# Patient Record
Sex: MALE | Race: OTHER | NOT HISPANIC OR LATINO
[De-identification: names, ages, dates, MRNs, and addresses within clinical notes are randomized per-mention and may not be internally consistent; named-entity substitution may affect disease eponyms.]

---

## 2018-08-28 ENCOUNTER — APPOINTMENT (OUTPATIENT)
Dept: ORTHOPEDIC SURGERY | Facility: CLINIC | Age: 63
End: 2018-08-28
Payer: MEDICARE

## 2018-08-28 VITALS
WEIGHT: 160.2 LBS | HEART RATE: 80 BPM | HEIGHT: 67 IN | BODY MASS INDEX: 25.15 KG/M2 | SYSTOLIC BLOOD PRESSURE: 122 MMHG | DIASTOLIC BLOOD PRESSURE: 68 MMHG

## 2018-08-28 DIAGNOSIS — Z60.2 PROBLEMS RELATED TO LIVING ALONE: ICD-10-CM

## 2018-08-28 DIAGNOSIS — Z86.79 PERSONAL HISTORY OF OTHER DISEASES OF THE CIRCULATORY SYSTEM: ICD-10-CM

## 2018-08-28 DIAGNOSIS — Z82.61 FAMILY HISTORY OF ARTHRITIS: ICD-10-CM

## 2018-08-28 DIAGNOSIS — Z72.3 LACK OF PHYSICAL EXERCISE: ICD-10-CM

## 2018-08-28 DIAGNOSIS — Z86.39 PERSONAL HISTORY OF OTHER ENDOCRINE, NUTRITIONAL AND METABOLIC DISEASE: ICD-10-CM

## 2018-08-28 PROCEDURE — 99204 OFFICE O/P NEW MOD 45 MIN: CPT

## 2018-08-28 PROCEDURE — 73564 X-RAY EXAM KNEE 4 OR MORE: CPT | Mod: LT,RT

## 2018-08-28 RX ORDER — SIMVASTATIN 80 MG/1
80 TABLET, FILM COATED ORAL
Refills: 0 | Status: ACTIVE | COMMUNITY

## 2018-08-28 SDOH — SOCIAL STABILITY - SOCIAL INSECURITY: PROBLEMS RELATED TO LIVING ALONE: Z60.2

## 2018-09-05 ENCOUNTER — CHART COPY (OUTPATIENT)
Age: 63
End: 2018-09-05

## 2018-09-12 ENCOUNTER — APPOINTMENT (OUTPATIENT)
Dept: ORTHOPEDIC SURGERY | Facility: CLINIC | Age: 63
End: 2018-09-12

## 2018-09-12 ENCOUNTER — CHART COPY (OUTPATIENT)
Age: 63
End: 2018-09-12

## 2018-09-26 ENCOUNTER — APPOINTMENT (OUTPATIENT)
Dept: ORTHOPEDIC SURGERY | Facility: CLINIC | Age: 63
End: 2018-09-26
Payer: MEDICARE

## 2018-09-26 VITALS
WEIGHT: 160 LBS | DIASTOLIC BLOOD PRESSURE: 90 MMHG | SYSTOLIC BLOOD PRESSURE: 156 MMHG | BODY MASS INDEX: 25.11 KG/M2 | HEIGHT: 67 IN | HEART RATE: 79 BPM

## 2018-09-26 DIAGNOSIS — M17.11 UNILATERAL PRIMARY OSTEOARTHRITIS, RIGHT KNEE: ICD-10-CM

## 2018-09-26 DIAGNOSIS — M25.562 PAIN IN RIGHT KNEE: ICD-10-CM

## 2018-09-26 DIAGNOSIS — M25.561 PAIN IN RIGHT KNEE: ICD-10-CM

## 2018-09-26 DIAGNOSIS — M23.92 UNSPECIFIED INTERNAL DERANGEMENT OF LEFT KNEE: ICD-10-CM

## 2018-09-26 DIAGNOSIS — G89.29 PAIN IN RIGHT KNEE: ICD-10-CM

## 2018-09-26 DIAGNOSIS — M17.12 UNILATERAL PRIMARY OSTEOARTHRITIS, LEFT KNEE: ICD-10-CM

## 2018-09-26 PROCEDURE — 99215 OFFICE O/P EST HI 40 MIN: CPT

## 2018-09-26 PROCEDURE — 73564 X-RAY EXAM KNEE 4 OR MORE: CPT | Mod: LT,RT

## 2019-03-06 ENCOUNTER — APPOINTMENT (OUTPATIENT)
Dept: INTERNAL MEDICINE | Facility: CLINIC | Age: 64
End: 2019-03-06
Payer: MEDICARE

## 2019-03-06 VITALS
SYSTOLIC BLOOD PRESSURE: 128 MMHG | BODY MASS INDEX: 26.84 KG/M2 | HEART RATE: 83 BPM | OXYGEN SATURATION: 97 % | HEIGHT: 66.5 IN | WEIGHT: 169 LBS | DIASTOLIC BLOOD PRESSURE: 70 MMHG | TEMPERATURE: 97.6 F

## 2019-03-06 DIAGNOSIS — D64.9 ANEMIA, UNSPECIFIED: ICD-10-CM

## 2019-03-06 DIAGNOSIS — I25.2 OLD MYOCARDIAL INFARCTION: ICD-10-CM

## 2019-03-06 DIAGNOSIS — Z86.79 PERSONAL HISTORY OF OTHER DISEASES OF THE CIRCULATORY SYSTEM: ICD-10-CM

## 2019-03-06 DIAGNOSIS — Z63.5 DISRUPTION OF FAMILY BY SEPARATION AND DIVORCE: ICD-10-CM

## 2019-03-06 DIAGNOSIS — Z12.5 ENCOUNTER FOR SCREENING FOR MALIGNANT NEOPLASM OF PROSTATE: ICD-10-CM

## 2019-03-06 DIAGNOSIS — Z87.74 PERSONAL HISTORY OF (CORRECTED) CONGENITAL MALFORMATIONS OF HEART AND CIRCULATORY SYSTEM: ICD-10-CM

## 2019-03-06 DIAGNOSIS — Z72.0 TOBACCO USE: ICD-10-CM

## 2019-03-06 PROCEDURE — 36415 COLL VENOUS BLD VENIPUNCTURE: CPT

## 2019-03-06 PROCEDURE — G0439: CPT

## 2019-03-06 PROCEDURE — 99214 OFFICE O/P EST MOD 30 MIN: CPT | Mod: 25

## 2019-03-06 RX ORDER — TIMOLOL MALEATE 2.5 MG/ML
0.25 SOLUTION/ DROPS OPHTHALMIC
Refills: 0 | Status: ACTIVE | COMMUNITY

## 2019-03-06 SDOH — SOCIAL STABILITY - SOCIAL INSECURITY: DISRUPTION OF FAMILY BY SEPARATION AND DIVORCE: Z63.5

## 2019-03-14 ENCOUNTER — APPOINTMENT (OUTPATIENT)
Dept: SURGERY | Facility: CLINIC | Age: 64
End: 2019-03-14
Payer: MEDICARE

## 2019-03-14 VITALS
OXYGEN SATURATION: 98 % | TEMPERATURE: 97.8 F | SYSTOLIC BLOOD PRESSURE: 154 MMHG | RESPIRATION RATE: 16 BRPM | HEART RATE: 70 BPM | DIASTOLIC BLOOD PRESSURE: 87 MMHG

## 2019-03-14 DIAGNOSIS — Z82.49 FAMILY HISTORY OF ISCHEMIC HEART DISEASE AND OTHER DISEASES OF THE CIRCULATORY SYSTEM: ICD-10-CM

## 2019-03-14 DIAGNOSIS — R06.02 SHORTNESS OF BREATH: ICD-10-CM

## 2019-03-14 DIAGNOSIS — Z82.3 FAMILY HISTORY OF STROKE: ICD-10-CM

## 2019-03-14 PROCEDURE — 99205 OFFICE O/P NEW HI 60 MIN: CPT

## 2019-03-14 NOTE — REASON FOR VISIT
[Consultation] : a consultation visit [FreeTextEntry1] : right inguinal pain, eferred by Dr. Mckinley Bardales

## 2019-03-14 NOTE — ASSESSMENT
[FreeTextEntry1] : This 64-year-old patient is suffering from a right inguinal hernia. I have offered him repair of this hernia. He has multiple cardiac comorbidities.  Please advise me on the safety of discontinuing his Coumadin preoperatively. Will he need to be bridged to the operation with Lovenox or intravenous heparin?  Would you advise a spinal anesthetic instead of general anesthesia question?

## 2019-03-14 NOTE — CONSULT LETTER
[Dear  ___] : Dear  [unfilled], [Sincerely,] : Sincerely, [FreeTextEntry3] : Charles Leo M.D. [DrMoises  ___] : Dr. REHMAN

## 2019-03-14 NOTE — PHYSICAL EXAM
[Normal Breath Sounds] : Normal breath sounds [Normal Heart Sounds] : normal heart sounds [Murmur] : murmur was appreciated  [No Rash or Lesion] : No rash or lesion [Calm] : calm [de-identified] : normal [de-identified] : normal [de-identified] : nor [de-identified] : soft nontender nondistended. They are easily reducible nontender incisional hernias either side of the midline; there is an easily reducible right inguinal hernia [de-identified] : penis and testicles normal [de-identified] : normal [de-identified] : normal

## 2019-03-19 LAB
ALBUMIN SERPL ELPH-MCNC: 4.4 G/DL
ALP BLD-CCNC: 73 U/L
ALT SERPL-CCNC: 24 U/L
ANION GAP SERPL CALC-SCNC: 11 MMOL/L
APPEARANCE: CLEAR
AST SERPL-CCNC: 29 U/L
BASOPHILS # BLD AUTO: 0.09 K/UL
BASOPHILS NFR BLD AUTO: 1 %
BILIRUB SERPL-MCNC: 0.6 MG/DL
BILIRUBIN URINE: NEGATIVE
BLOOD URINE: NEGATIVE
BUN SERPL-MCNC: 14 MG/DL
CALCIUM SERPL-MCNC: 9.3 MG/DL
CHLORIDE SERPL-SCNC: 106 MMOL/L
CHOLEST SERPL-MCNC: 137 MG/DL
CHOLEST/HDLC SERPL: 2.5 RATIO
CO2 SERPL-SCNC: 22 MMOL/L
COLOR: YELLOW
CREAT SERPL-MCNC: 1.33 MG/DL
EOSINOPHIL # BLD AUTO: 0.87 K/UL
EOSINOPHIL NFR BLD AUTO: 9.9 %
GLUCOSE QUALITATIVE U: NEGATIVE
GLUCOSE SERPL-MCNC: 105 MG/DL
HBA1C MFR BLD HPLC: 5.7 %
HCT VFR BLD CALC: 44.6 %
HDLC SERPL-MCNC: 54 MG/DL
HGB BLD-MCNC: 14.1 G/DL
IMM GRANULOCYTES NFR BLD AUTO: 0.2 %
INR PPP: 3.58 RATIO
KETONES URINE: NEGATIVE
LDLC SERPL CALC-MCNC: 63 MG/DL
LEUKOCYTE ESTERASE URINE: NEGATIVE
LYMPHOCYTES # BLD AUTO: 1.38 K/UL
LYMPHOCYTES NFR BLD AUTO: 15.7 %
MAN DIFF?: NORMAL
MCHC RBC-ENTMCNC: 29.1 PG
MCHC RBC-ENTMCNC: 31.6 GM/DL
MCV RBC AUTO: 92 FL
MONOCYTES # BLD AUTO: 0.57 K/UL
MONOCYTES NFR BLD AUTO: 6.5 %
NEUTROPHILS # BLD AUTO: 5.85 K/UL
NEUTROPHILS NFR BLD AUTO: 66.7 %
NITRITE URINE: NEGATIVE
PH URINE: 6.5
PLATELET # BLD AUTO: 273 K/UL
POTASSIUM SERPL-SCNC: 4.7 MMOL/L
PROT SERPL-MCNC: 7 G/DL
PROTEIN URINE: NEGATIVE
PSA SERPL-MCNC: 2.23 NG/ML
PT BLD: 42.5 SEC
RBC # BLD: 4.85 M/UL
RBC # FLD: 14.6 %
SODIUM SERPL-SCNC: 138 MMOL/L
SPECIFIC GRAVITY URINE: 1.01
T4 FREE SERPL-MCNC: 1.2 NG/DL
TRIGL SERPL-MCNC: 99 MG/DL
TSH SERPL-ACNC: 3.85 UIU/ML
UROBILINOGEN URINE: NORMAL
WBC # FLD AUTO: 8.78 K/UL

## 2019-04-29 ENCOUNTER — APPOINTMENT (OUTPATIENT)
Dept: INTERNAL MEDICINE | Facility: CLINIC | Age: 64
End: 2019-04-29
Payer: MEDICARE

## 2019-04-29 VITALS
TEMPERATURE: 97.8 F | SYSTOLIC BLOOD PRESSURE: 140 MMHG | HEIGHT: 66.5 IN | HEART RATE: 78 BPM | WEIGHT: 160 LBS | DIASTOLIC BLOOD PRESSURE: 70 MMHG | OXYGEN SATURATION: 95 % | BODY MASS INDEX: 25.41 KG/M2

## 2019-04-29 DIAGNOSIS — Z87.74 PERSONAL HISTORY OF (CORRECTED) CONGENITAL MALFORMATIONS OF HEART AND CIRCULATORY SYSTEM: ICD-10-CM

## 2019-04-29 DIAGNOSIS — Z95.2 PRESENCE OF PROSTHETIC HEART VALVE: ICD-10-CM

## 2019-04-29 LAB
INR PPP: 3
PT BLD: 30.2

## 2019-04-29 PROCEDURE — 99215 OFFICE O/P EST HI 40 MIN: CPT | Mod: 25

## 2019-04-29 PROCEDURE — 36415 COLL VENOUS BLD VENIPUNCTURE: CPT

## 2019-04-29 RX ORDER — CLONAZEPAM 0.5 MG/1
0.5 TABLET ORAL
Qty: 30 | Refills: 0 | Status: ACTIVE | COMMUNITY

## 2019-05-03 LAB
25(OH)D3 SERPL-MCNC: 32.9 NG/ML
VIT B12 SERPL-MCNC: 435 PG/ML

## 2019-05-07 ENCOUNTER — OUTPATIENT (OUTPATIENT)
Dept: OUTPATIENT SERVICES | Facility: HOSPITAL | Age: 64
LOS: 1 days | End: 2019-05-07
Payer: MEDICARE

## 2019-05-07 VITALS
RESPIRATION RATE: 14 BRPM | HEART RATE: 70 BPM | HEIGHT: 67 IN | TEMPERATURE: 98 F | WEIGHT: 156.97 LBS | SYSTOLIC BLOOD PRESSURE: 134 MMHG | DIASTOLIC BLOOD PRESSURE: 79 MMHG | OXYGEN SATURATION: 96 %

## 2019-05-07 DIAGNOSIS — Z95.4 PRESENCE OF OTHER HEART-VALVE REPLACEMENT: Chronic | ICD-10-CM

## 2019-05-07 DIAGNOSIS — Z95.0 PRESENCE OF CARDIAC PACEMAKER: ICD-10-CM

## 2019-05-07 DIAGNOSIS — K40.90 UNILATERAL INGUINAL HERNIA, WITHOUT OBSTRUCTION OR GANGRENE, NOT SPECIFIED AS RECURRENT: ICD-10-CM

## 2019-05-07 DIAGNOSIS — Z98.890 OTHER SPECIFIED POSTPROCEDURAL STATES: Chronic | ICD-10-CM

## 2019-05-07 DIAGNOSIS — Z95.2 PRESENCE OF PROSTHETIC HEART VALVE: ICD-10-CM

## 2019-05-07 DIAGNOSIS — Z01.818 ENCOUNTER FOR OTHER PREPROCEDURAL EXAMINATION: ICD-10-CM

## 2019-05-07 PROCEDURE — G0463: CPT

## 2019-05-07 RX ORDER — CEFOTETAN DISODIUM 1 G
2 VIAL (EA) INJECTION ONCE
Refills: 0 | Status: DISCONTINUED | OUTPATIENT
Start: 2019-05-14 | End: 2019-05-16

## 2019-05-07 NOTE — H&P PST ADULT - NSANTHOSAYNRD_GEN_A_CORE
No. CAPO screening performed.  STOP BANG Legend: 0-2 = LOW Risk; 3-4 = INTERMEDIATE Risk; 5-8 = HIGH Risk

## 2019-05-07 NOTE — H&P PST ADULT - NSICDXPASTSURGICALHX_GEN_ALL_CORE_FT
PAST SURGICAL HISTORY:  S/P aortic valve replacement with metallic valve 1990 and 2003    S/P exploratory laparotomy after valve replacement 2003

## 2019-05-07 NOTE — H&P PST ADULT - NSICDXPASTMEDICALHX_GEN_ALL_CORE_FT
PAST MEDICAL HISTORY:  H/O mechanical aortic valve replacement     Right inguinal hernia PAST MEDICAL HISTORY:  Afib     Anxiety     Bicuspid aortic valve     Depression     Right inguinal hernia     Subdural hematoma remote history PAST MEDICAL HISTORY:  Afib     Anxiety     Bicuspid aortic valve     Chronic shortness of breath with exertion    Depression taking MAOI    Glaucoma     Hepatitis B remote history; while in [facility]    Long term current use of anticoagulant     Mechanical heart valve present [facility]  - pt is unsure of maker    Pacemaker [id number]  implanted 2006 w/ generator change 2014       Right inguinal hernia     Subdural hematoma remote history

## 2019-05-07 NOTE — H&P PST ADULT - HISTORY OF PRESENT ILLNESS
65 y/o male with pmhx of HTN, HLD, Afib, CHB s/p PPM w/ generator change in 2014, 63 y/o male with pmhx of HTN, HLD, Afib, CHB s/p PPM w/ generator change in 2014,       right bulging for 6 months with only recent pain/burning sensation. No changes to bowel/bladder habits. reducible 65 y/o male with pmhx of Afib s/p PPM placement in 2006, bicuspid aortic valve s/p mechanical valve replacement in 2003 now on Coumadin (last INR approx. 2 weeks ago: 3.0), HTN, anxiety/depression, reports experiencing right groin bulging for 6 months with only recent pain/burning sensation. No changes to bowel/bladder habits. He is using a truss which he states he very helpful and provides comfort. Presents for right inguinal hernia repair with mesh. 63 y/o male with pmhx of Afib s/p PPM placement in 2006, bicuspid aortic valve s/p mechanical valve replacement in 1990 & 2003 now on Coumadin (last INR 3.0), HTN, anxiety/depression, reports experiencing right groin bulging for 6 months with only recent pain/burning sensation. No changes to bowel/bladder habits. He is using a truss which provides comfort. Presents for right inguinal hernia repair with mesh.

## 2019-05-07 NOTE — H&P PST ADULT - ACTIVITY
chronic FALLON; while walking on flat surface he is fine but when he walks up a hill or a flight of stairs he is SOB

## 2019-05-07 NOTE — H&P PST ADULT - NEGATIVE GASTROINTESTINAL SYMPTOMS
no melena/no jaundice/no abdominal pain/no vomiting/no constipation/no hematochezia/no diarrhea/no nausea

## 2019-05-07 NOTE — H&P PST ADULT - NSICDXPROBLEM_GEN_ALL_CORE_FT
PROBLEM DIAGNOSES  Problem: Right inguinal hernia  Assessment and Plan: Scheduled for right inguinal hernia repair  pt is on a MAOI - s/w Dr. King; pt is to c/w meds. Note made on the flowsheet       Problem: Mechanical heart valve present  Assessment and Plan: As per patient, he has been in contact with his EP office - he was instructed to hold Coumadin 5 days preop and will start Lovenox (last dose will be the day before surgery)   STAT PT/INR on admission     Problem: Pacemaker  Assessment and Plan: Will request recent interrogation from Dr. Solorzano's office (EP)   OR booking notified PROBLEM DIAGNOSES  Problem: Right inguinal hernia  Assessment and Plan: Scheduled for right inguinal hernia repair  pt is on a MAOI. s/w Dr. King; pt is to c/w med. Note made on the flowsheet       Problem: Mechanical heart valve present  Assessment and Plan: As per patient, he has been in contact with his EP office - he was instructed to hold Coumadin 5 days preop and will start Lovenox (last dose will be the day before surgery)   STAT PT/INR on admission     Problem: Pacemaker  Assessment and Plan: Will request recent interrogation from Dr. Solorzano's office (EP)   OR booking notified

## 2019-05-08 PROBLEM — F32.9 MAJOR DEPRESSIVE DISORDER, SINGLE EPISODE, UNSPECIFIED: Chronic | Status: ACTIVE | Noted: 2019-05-07

## 2019-05-08 PROBLEM — Z95.2 PRESENCE OF PROSTHETIC HEART VALVE: Chronic | Status: ACTIVE | Noted: 2019-05-07

## 2019-05-08 PROBLEM — B19.10 UNSPECIFIED VIRAL HEPATITIS B WITHOUT HEPATIC COMA: Chronic | Status: ACTIVE | Noted: 2019-05-07

## 2019-05-08 PROBLEM — R06.02 SHORTNESS OF BREATH: Chronic | Status: ACTIVE | Noted: 2019-05-07

## 2019-05-08 PROBLEM — S06.5X9A TRAUMATIC SUBDURAL HEMORRHAGE WITH LOSS OF CONSCIOUSNESS OF UNSPECIFIED DURATION, INITIAL ENCOUNTER: Chronic | Status: ACTIVE | Noted: 2019-05-07

## 2019-05-08 PROBLEM — Z95.0 PRESENCE OF CARDIAC PACEMAKER: Chronic | Status: ACTIVE | Noted: 2019-05-07

## 2019-05-12 NOTE — HISTORY OF PRESENT ILLNESS
[Aortic Stenosis] : aortic stenosis [Atrial Fibrillation] : atrial fibrillation [Coronary Artery Disease] : coronary artery disease [Implantable Device/Pacemaker] : implantable device/pacemaker [No Pertinent Pulmonary History] : no history of asthma, COPD, sleep apnea, or smoking [No Adverse Anesthesia Reaction] : no adverse anesthesia reaction in self or family member [Chronic Anticoagulation] : chronic anticoagulation [Chronic Kidney Disease] : chronic kidney disease [(Patient denies any chest pain, claudication, dyspnea on exertion, orthopnea, palpitations or syncope)] : Patient denies any chest pain, claudication, dyspnea on exertion, orthopnea, palpitations or syncope [FreeTextEntry1] : R inguinal hernia repair  [FreeTextEntry2] : 5/14/19 [FreeTextEntry3] : Dr Charles Leo - [facility]  [FreeTextEntry4] : Presents for medical evaluation prior to planned inguinal hernia repair. He feels well currently w no concerns. \par  His cardiovascular history is significant for aortic valve disease s/p metallic AVR and s/p repair of coarctation of the aorta. His goal INR range is 2.5-3.5. Atrial fibrillation rate-controlled and anticoagulated on warfarin without bleeding problems. Hx of AV block s/p PPM placement.  CAD s/p CABG and NSVT which is recently well controlled and stable. He follows w Dr Suzie Solorzano at [facility]. \par   Plan for continued anticoagulation perioperatively. He will bridge from warfarin to Lovenox injections for 5 days prior to surgery. He has been given instructions by his cardiologist.   \par  CKD stage 3 stable  \par Depression and anxiety are well-controlled, he is on an MAOI, managed by his psychiatrist

## 2019-05-12 NOTE — REVIEW OF SYSTEMS
[Negative] : Heme/Lymph [Chest Pain] : no chest pain [Palpitations] : no palpitations [Lower Ext Edema] : no lower extremity edema [Orthopena] : no orthopnea [Shortness Of Breath] : no shortness of breath [Dyspnea on Exertion] : not dyspnea on exertion [Diarrhea] : no diarrhea [Vomiting] : no vomiting [Dysuria] : no dysuria [Joint Pain] : no joint pain [Joint Swelling] : no joint swelling

## 2019-05-12 NOTE — PHYSICAL EXAM
[No Acute Distress] : no acute distress [Well-Appearing] : well-appearing [Normal Voice/Communication] : normal voice/communication [No JVD] : no jugular venous distention [Supple] : supple [No Lymphadenopathy] : no lymphadenopathy [No Respiratory Distress] : no respiratory distress  [Clear to Auscultation] : lungs were clear to auscultation bilaterally [No Accessory Muscle Use] : no accessory muscle use [Normal Rate] : normal rate  [Regular Rhythm] : with a regular rhythm [Normal S1, S2] : normal S1 and S2 [No Carotid Bruits] : no carotid bruits [Pedal Pulses Present] : the pedal pulses are present [No Edema] : there was no peripheral edema [Grossly Normal Strength/Tone] : grossly normal strength/tone [Normal Gait] : normal gait [Coordination Grossly Intact] : coordination grossly intact [Normal Affect] : the affect was normal [Alert and Oriented x3] : oriented to person, place, and time [Normal Mood] : the mood was normal [Normal Insight/Judgement] : insight and judgment were intact [de-identified] : grade 2/6 systolic murmur unchanged. PM placed L chest

## 2019-05-12 NOTE — ASSESSMENT
[Patient Optimized for Surgery] : Patient optimized for surgery [No Further Testing Recommended] : no further testing recommended [Modify anticoagulant treatment prior to procedure] : Modify anticoagulant treatment prior to procedure [Continue medications as is] : Continue current medications [As per surgery] : as per surgery [FreeTextEntry5] : he will hold warfarin 5 days prior to surgery, bridge to Lovenox injections and continue perioperatively, to be managed by his psychiatrist

## 2019-05-12 NOTE — PLAN
[FreeTextEntry1] : discussed w pt \par \par reviewed his most recent cardiology notes and PM interrogation \par \par anticoagulation to be managed as above, Lovenox to be prescribed by his cardiologist , Dr Suzie Solorzano () \par \par cont other rx as prescribed \par \par he will follow up with surgery as scheduled postoperatively \par \par please call with any questions

## 2019-05-13 ENCOUNTER — TRANSCRIPTION ENCOUNTER (OUTPATIENT)
Age: 64
End: 2019-05-13

## 2019-05-14 ENCOUNTER — INPATIENT (INPATIENT)
Facility: HOSPITAL | Age: 64
LOS: 1 days | Discharge: HOME | DRG: 352 | End: 2019-05-16
Attending: SURGERY | Admitting: SURGERY
Payer: MEDICARE

## 2019-05-14 ENCOUNTER — RESULT REVIEW (OUTPATIENT)
Age: 64
End: 2019-05-14

## 2019-05-14 ENCOUNTER — APPOINTMENT (OUTPATIENT)
Dept: SURGERY | Facility: HOSPITAL | Age: 64
End: 2019-05-14
Payer: MEDICARE

## 2019-05-14 VITALS
DIASTOLIC BLOOD PRESSURE: 79 MMHG | RESPIRATION RATE: 18 BRPM | HEART RATE: 72 BPM | WEIGHT: 156.97 LBS | SYSTOLIC BLOOD PRESSURE: 146 MMHG | TEMPERATURE: 98 F | HEIGHT: 67 IN | OXYGEN SATURATION: 97 %

## 2019-05-14 DIAGNOSIS — Z95.4 PRESENCE OF OTHER HEART-VALVE REPLACEMENT: Chronic | ICD-10-CM

## 2019-05-14 DIAGNOSIS — Z98.890 OTHER SPECIFIED POSTPROCEDURAL STATES: Chronic | ICD-10-CM

## 2019-05-14 DIAGNOSIS — K40.90 UNILATERAL INGUINAL HERNIA, WITHOUT OBSTRUCTION OR GANGRENE, NOT SPECIFIED AS RECURRENT: ICD-10-CM

## 2019-05-14 LAB
ANION GAP SERPL CALC-SCNC: 11 MMOL/L — SIGNIFICANT CHANGE UP (ref 5–17)
BUN SERPL-MCNC: 13 MG/DL — SIGNIFICANT CHANGE UP (ref 7–23)
CALCIUM SERPL-MCNC: 9 MG/DL — SIGNIFICANT CHANGE UP (ref 8.4–10.5)
CHLORIDE SERPL-SCNC: 99 MMOL/L — SIGNIFICANT CHANGE UP (ref 96–108)
CO2 SERPL-SCNC: 26 MMOL/L — SIGNIFICANT CHANGE UP (ref 22–31)
CREAT SERPL-MCNC: 1.33 MG/DL — HIGH (ref 0.5–1.3)
GLUCOSE SERPL-MCNC: 123 MG/DL — HIGH (ref 70–99)
HCT VFR BLD CALC: 39.2 % — SIGNIFICANT CHANGE UP (ref 39–50)
HGB BLD-MCNC: 13.5 G/DL — SIGNIFICANT CHANGE UP (ref 13–17)
INR BLD: 1.48 RATIO — HIGH (ref 0.88–1.16)
MCHC RBC-ENTMCNC: 31.6 PG — SIGNIFICANT CHANGE UP (ref 27–34)
MCHC RBC-ENTMCNC: 34.3 GM/DL — SIGNIFICANT CHANGE UP (ref 32–36)
MCV RBC AUTO: 91.9 FL — SIGNIFICANT CHANGE UP (ref 80–100)
PLATELET # BLD AUTO: 227 K/UL — SIGNIFICANT CHANGE UP (ref 150–400)
POTASSIUM SERPL-MCNC: 5 MMOL/L — SIGNIFICANT CHANGE UP (ref 3.5–5.3)
POTASSIUM SERPL-SCNC: 5 MMOL/L — SIGNIFICANT CHANGE UP (ref 3.5–5.3)
PROTHROM AB SERPL-ACNC: 17.2 SEC — HIGH (ref 10–12.9)
RBC # BLD: 4.27 M/UL — SIGNIFICANT CHANGE UP (ref 4.2–5.8)
RBC # FLD: 12.2 % — SIGNIFICANT CHANGE UP (ref 10.3–14.5)
SODIUM SERPL-SCNC: 136 MMOL/L — SIGNIFICANT CHANGE UP (ref 135–145)
WBC # BLD: 11.6 K/UL — HIGH (ref 3.8–10.5)
WBC # FLD AUTO: 11.6 K/UL — HIGH (ref 3.8–10.5)

## 2019-05-14 PROCEDURE — 88304 TISSUE EXAM BY PATHOLOGIST: CPT | Mod: 26

## 2019-05-14 PROCEDURE — 49505 PRP I/HERN INIT REDUC >5 YR: CPT | Mod: RT

## 2019-05-14 RX ORDER — LAMOTRIGINE 25 MG/1
200 TABLET, ORALLY DISINTEGRATING ORAL DAILY
Refills: 0 | Status: DISCONTINUED | OUTPATIENT
Start: 2019-05-14 | End: 2019-05-16

## 2019-05-14 RX ORDER — LAMOTRIGINE 25 MG/1
1 TABLET, ORALLY DISINTEGRATING ORAL
Qty: 0 | Refills: 0 | DISCHARGE

## 2019-05-14 RX ORDER — ACETAMINOPHEN 500 MG
650 TABLET ORAL EVERY 6 HOURS
Refills: 0 | Status: DISCONTINUED | OUTPATIENT
Start: 2019-05-14 | End: 2019-05-16

## 2019-05-14 RX ORDER — OXYCODONE HYDROCHLORIDE 5 MG/1
10 TABLET ORAL EVERY 6 HOURS
Refills: 0 | Status: DISCONTINUED | OUTPATIENT
Start: 2019-05-14 | End: 2019-05-16

## 2019-05-14 RX ORDER — HYDROMORPHONE HYDROCHLORIDE 2 MG/ML
0.5 INJECTION INTRAMUSCULAR; INTRAVENOUS; SUBCUTANEOUS
Refills: 0 | Status: DISCONTINUED | OUTPATIENT
Start: 2019-05-14 | End: 2019-05-14

## 2019-05-14 RX ORDER — LATANOPROST 0.05 MG/ML
1 SOLUTION/ DROPS OPHTHALMIC; TOPICAL AT BEDTIME
Refills: 0 | Status: DISCONTINUED | OUTPATIENT
Start: 2019-05-14 | End: 2019-05-16

## 2019-05-14 RX ORDER — SIMVASTATIN 20 MG/1
1 TABLET, FILM COATED ORAL
Qty: 0 | Refills: 0 | DISCHARGE

## 2019-05-14 RX ORDER — ONDANSETRON 8 MG/1
4 TABLET, FILM COATED ORAL ONCE
Refills: 0 | Status: DISCONTINUED | OUTPATIENT
Start: 2019-05-14 | End: 2019-05-14

## 2019-05-14 RX ORDER — LOSARTAN POTASSIUM 100 MG/1
1 TABLET, FILM COATED ORAL
Qty: 0 | Refills: 0 | DISCHARGE

## 2019-05-14 RX ORDER — ENOXAPARIN SODIUM 100 MG/ML
80 INJECTION SUBCUTANEOUS EVERY 12 HOURS
Refills: 0 | Status: DISCONTINUED | OUTPATIENT
Start: 2019-05-14 | End: 2019-05-14

## 2019-05-14 RX ORDER — SIMVASTATIN 20 MG/1
20 TABLET, FILM COATED ORAL AT BEDTIME
Refills: 0 | Status: DISCONTINUED | OUTPATIENT
Start: 2019-05-14 | End: 2019-05-16

## 2019-05-14 RX ORDER — LATANOPROST 0.05 MG/ML
1 SOLUTION/ DROPS OPHTHALMIC; TOPICAL
Qty: 0 | Refills: 0 | DISCHARGE

## 2019-05-14 RX ORDER — ENOXAPARIN SODIUM 100 MG/ML
70 INJECTION SUBCUTANEOUS EVERY 12 HOURS
Refills: 0 | Status: DISCONTINUED | OUTPATIENT
Start: 2019-05-14 | End: 2019-05-16

## 2019-05-14 RX ORDER — PHENELZINE SULFATE 15 MG/1
30 TABLET, FILM COATED ORAL
Qty: 0 | Refills: 0 | DISCHARGE

## 2019-05-14 RX ORDER — TIMOLOL 0.5 %
1 DROPS OPHTHALMIC (EYE) DAILY
Refills: 0 | Status: DISCONTINUED | OUTPATIENT
Start: 2019-05-14 | End: 2019-05-16

## 2019-05-14 RX ORDER — WARFARIN SODIUM 2.5 MG/1
1 TABLET ORAL
Qty: 0 | Refills: 0 | DISCHARGE

## 2019-05-14 RX ORDER — SODIUM CHLORIDE 9 MG/ML
3 INJECTION INTRAMUSCULAR; INTRAVENOUS; SUBCUTANEOUS EVERY 8 HOURS
Refills: 0 | Status: DISCONTINUED | OUTPATIENT
Start: 2019-05-14 | End: 2019-05-14

## 2019-05-14 RX ORDER — SODIUM CHLORIDE 9 MG/ML
1000 INJECTION, SOLUTION INTRAVENOUS
Refills: 0 | Status: DISCONTINUED | OUTPATIENT
Start: 2019-05-14 | End: 2019-05-15

## 2019-05-14 RX ORDER — LIDOCAINE HCL 20 MG/ML
0.2 VIAL (ML) INJECTION ONCE
Refills: 0 | Status: DISCONTINUED | OUTPATIENT
Start: 2019-05-14 | End: 2019-05-14

## 2019-05-14 RX ORDER — LOSARTAN POTASSIUM 100 MG/1
50 TABLET, FILM COATED ORAL DAILY
Refills: 0 | Status: DISCONTINUED | OUTPATIENT
Start: 2019-05-14 | End: 2019-05-16

## 2019-05-14 RX ORDER — OXYCODONE HYDROCHLORIDE 5 MG/1
5 TABLET ORAL EVERY 4 HOURS
Refills: 0 | Status: DISCONTINUED | OUTPATIENT
Start: 2019-05-14 | End: 2019-05-16

## 2019-05-14 RX ORDER — CHLORHEXIDINE GLUCONATE 213 G/1000ML
1 SOLUTION TOPICAL DAILY
Refills: 0 | Status: DISCONTINUED | OUTPATIENT
Start: 2019-05-14 | End: 2019-05-14

## 2019-05-14 RX ORDER — CLONAZEPAM 1 MG
1 TABLET ORAL
Qty: 0 | Refills: 0 | DISCHARGE

## 2019-05-14 RX ADMIN — HYDROMORPHONE HYDROCHLORIDE 0.5 MILLIGRAM(S): 2 INJECTION INTRAMUSCULAR; INTRAVENOUS; SUBCUTANEOUS at 16:35

## 2019-05-14 RX ADMIN — HYDROMORPHONE HYDROCHLORIDE 0.5 MILLIGRAM(S): 2 INJECTION INTRAMUSCULAR; INTRAVENOUS; SUBCUTANEOUS at 16:20

## 2019-05-14 RX ADMIN — ENOXAPARIN SODIUM 70 MILLIGRAM(S): 100 INJECTION SUBCUTANEOUS at 19:32

## 2019-05-14 RX ADMIN — SIMVASTATIN 20 MILLIGRAM(S): 20 TABLET, FILM COATED ORAL at 22:40

## 2019-05-14 RX ADMIN — SODIUM CHLORIDE 100 MILLILITER(S): 9 INJECTION, SOLUTION INTRAVENOUS at 17:09

## 2019-05-14 NOTE — BRIEF OPERATIVE NOTE - NSICDXBRIEFPREOP_GEN_ALL_CORE_FT
PRE-OP DIAGNOSIS:  Right inguinal hernia 14-May-2019 15:09:42  Kevin Loera  Right inguinal hernia 14-May-2019 15:09:17  Kevin Loera

## 2019-05-14 NOTE — CHART NOTE - NSCHARTNOTEFT_GEN_A_CORE
64M POD0 four hours s/p RIH repair. No acute events since OR. Pain controlled. Tolerating PO CLD. No N/V. Voiding.    Vital Signs (24 Hrs):  T(C): 37.1 (05-14-19 @ 21:10), Max: 37.1 (05-14-19 @ 21:10)  HR: 71 (05-14-19 @ 21:10) (69 - 81)  BP: 103/54 (05-14-19 @ 21:10) (103/54 - 146/79)  RR: 18 (05-14-19 @ 21:10) (16 - 19)  SpO2: 96% (05-14-19 @ 21:10) (94% - 100%)  Wt(kg): --  Daily Height in cm: 170.18 (14 May 2019 12:14)    Daily     I&O's Summary    14 May 2019 07:01  -  14 May 2019 22:17  --------------------------------------------------------  IN: 560 mL / OUT: 550 mL / NET: 10 mL      PE:  AOx3 NAD  Unlabored breathing  Abdomen soft, nontender, non distended  R inguinal incision dressing blood tinged, no evidence active bleeding or hematoma  Ext WWP    A/P: 64M progressing well after RIH repair.  - T Lovenox  - Bedrest O/N  - Sandbag on incision site

## 2019-05-14 NOTE — BRIEF OPERATIVE NOTE - NSICDXBRIEFPROCEDURE_GEN_ALL_CORE_FT
PROCEDURES:  Open repair of inguinal hernia in adult 14-May-2019 15:08:35 right, w/ mesh Kevin Loera

## 2019-05-14 NOTE — CHART NOTE - NSCHARTNOTEFT_GEN_A_CORE
Pt on therapeutic lovenox for heart valve and now s/p right inguinal hernia repair with mesh, w/ some oozing at end of case. Pt to receive dose of lovenox tonight, and would like to monitor postoperative bleeding overnight.     [facility]

## 2019-05-14 NOTE — PROVIDER CONTACT NOTE (OTHER) - SITUATION
pt here today for inguinal hernia repair with Dr Villalobos, patient had 1/2 cup of tea with splenda at 9 a.m.

## 2019-05-15 ENCOUNTER — TRANSCRIPTION ENCOUNTER (OUTPATIENT)
Age: 64
End: 2019-05-15

## 2019-05-15 LAB
HCT VFR BLD CALC: 36 % — LOW (ref 39–50)
HGB BLD-MCNC: 11.7 G/DL — LOW (ref 13–17)
MCHC RBC-ENTMCNC: 30.2 PG — SIGNIFICANT CHANGE UP (ref 27–34)
MCHC RBC-ENTMCNC: 32.4 GM/DL — SIGNIFICANT CHANGE UP (ref 32–36)
MCV RBC AUTO: 93 FL — SIGNIFICANT CHANGE UP (ref 80–100)
PLATELET # BLD AUTO: 241 K/UL — SIGNIFICANT CHANGE UP (ref 150–400)
RBC # BLD: 3.87 M/UL — LOW (ref 4.2–5.8)
RBC # FLD: 12.2 % — SIGNIFICANT CHANGE UP (ref 10.3–14.5)
WBC # BLD: 10.6 K/UL — HIGH (ref 3.8–10.5)
WBC # FLD AUTO: 10.6 K/UL — HIGH (ref 3.8–10.5)

## 2019-05-15 RX ORDER — CLONAZEPAM 1 MG
0.5 TABLET ORAL AT BEDTIME
Refills: 0 | Status: DISCONTINUED | OUTPATIENT
Start: 2019-05-15 | End: 2019-05-16

## 2019-05-15 RX ORDER — ENOXAPARIN SODIUM 100 MG/ML
70 INJECTION SUBCUTANEOUS
Qty: 100 | Refills: 0
Start: 2019-05-15

## 2019-05-15 RX ORDER — ACETAMINOPHEN 500 MG
2 TABLET ORAL
Qty: 0 | Refills: 0 | DISCHARGE
Start: 2019-05-15

## 2019-05-15 RX ADMIN — Medication 650 MILLIGRAM(S): at 23:16

## 2019-05-15 RX ADMIN — OXYCODONE HYDROCHLORIDE 10 MILLIGRAM(S): 5 TABLET ORAL at 05:54

## 2019-05-15 RX ADMIN — LOSARTAN POTASSIUM 50 MILLIGRAM(S): 100 TABLET, FILM COATED ORAL at 05:57

## 2019-05-15 RX ADMIN — OXYCODONE HYDROCHLORIDE 10 MILLIGRAM(S): 5 TABLET ORAL at 12:46

## 2019-05-15 RX ADMIN — LOSARTAN POTASSIUM 50 MILLIGRAM(S): 100 TABLET, FILM COATED ORAL at 22:26

## 2019-05-15 RX ADMIN — OXYCODONE HYDROCHLORIDE 10 MILLIGRAM(S): 5 TABLET ORAL at 19:13

## 2019-05-15 RX ADMIN — OXYCODONE HYDROCHLORIDE 10 MILLIGRAM(S): 5 TABLET ORAL at 12:15

## 2019-05-15 RX ADMIN — ENOXAPARIN SODIUM 70 MILLIGRAM(S): 100 INJECTION SUBCUTANEOUS at 05:59

## 2019-05-15 RX ADMIN — Medication 0.5 MILLIGRAM(S): at 23:17

## 2019-05-15 RX ADMIN — ENOXAPARIN SODIUM 70 MILLIGRAM(S): 100 INJECTION SUBCUTANEOUS at 17:20

## 2019-05-15 RX ADMIN — LATANOPROST 1 DROP(S): 0.05 SOLUTION/ DROPS OPHTHALMIC; TOPICAL at 22:26

## 2019-05-15 RX ADMIN — SIMVASTATIN 20 MILLIGRAM(S): 20 TABLET, FILM COATED ORAL at 22:26

## 2019-05-15 RX ADMIN — SODIUM CHLORIDE 100 MILLILITER(S): 9 INJECTION, SOLUTION INTRAVENOUS at 00:44

## 2019-05-15 RX ADMIN — Medication 1 DROP(S): at 11:20

## 2019-05-15 RX ADMIN — LATANOPROST 1 DROP(S): 0.05 SOLUTION/ DROPS OPHTHALMIC; TOPICAL at 06:00

## 2019-05-15 RX ADMIN — Medication 650 MILLIGRAM(S): at 23:46

## 2019-05-15 RX ADMIN — LAMOTRIGINE 200 MILLIGRAM(S): 25 TABLET, ORALLY DISINTEGRATING ORAL at 05:57

## 2019-05-15 NOTE — DISCHARGE NOTE PROVIDER - NSDCCPCAREPLAN_GEN_ALL_CORE_FT
PRINCIPAL DISCHARGE DIAGNOSIS  Diagnosis: S/P inguinal hernia repair  Assessment and Plan of Treatment: WOUND CARE: You may shower. Pat Dry abdomen. Leave the white steri strips in place, they will fall off on their own in approximately 5-7 days.  If you notice swelling or increased pain around the site, signs of excessive bruising, please contact the office or return to the emergency department.   BATHING: Please do not submerge wound underwater. You may shower and/or sponge bathe.  ACTIVITY: No heavy lifting anything more than 10-15lbs or straining. Please rest without strenuous activity while recovering postoperatively before your post-operative follow-up visit.   DIET: Continue regular diet per routine.   NOTIFY YOUR SURGEON IF: You have any bleeding that does not stop, any pus draining from your wound, any fever (over 100.4 F) or chills, persistent nausea/vomiting with inability to tolerate food or liquids, persistent diarrhea, or if your pain is not controlled on your discharge pain medications.  FOLLOW-UP:  1. Please call to make a follow-up appointment within one week of discharge.  2. Please follow up with your primary care physician in one week regarding your hospitalization.         SECONDARY DISCHARGE DIAGNOSES  Diagnosis: H/O aortic valve replacement  Assessment and Plan of Treatment: Please restart your coumadin TOMORROW (5/16) night as per routine.  You should follow-up with your Cardiologist/PCP to manage dosing and coagulation levels. PRINCIPAL DISCHARGE DIAGNOSIS  Diagnosis: S/P inguinal hernia repair  Assessment and Plan of Treatment: WOUND CARE: You may shower. Pat Dry abdomen. Leave the white steri strips in place, they will fall off on their own in approximately 5-7 days.  If you notice swelling or increased pain around the site, signs of excessive bruising, please contact the office or return to the emergency department.   BATHING: Please do not submerge wound underwater. You may shower and/or sponge bathe.  ACTIVITY: No heavy lifting anything more than 10-15lbs or straining. Please rest without strenuous activity while recovering postoperatively before your post-operative follow-up visit.   DIET: Continue regular diet per routine.   NOTIFY YOUR SURGEON IF: You have any bleeding that does not stop, any pus draining from your wound, any fever (over 100.4 F) or chills, persistent nausea/vomiting with inability to tolerate food or liquids, persistent diarrhea, or if your pain is not controlled on your discharge pain medications.  FOLLOW-UP:  1. Please call to make a follow-up appointment within one week of discharge.  2. Please follow up with your primary care physician in one week regarding your hospitalization.         SECONDARY DISCHARGE DIAGNOSES  Diagnosis: H/O aortic valve replacement  Assessment and Plan of Treatment: Please restart your coumadin TOMORROW (5/16) night as per routine.  Please continue to take therapeutic lovenox and follow-up closely with the provider that manages your Coumadin.  Please call as soon as you are discharged to make sure you have appropriate follow-up.   You should follow-up with your Cardiologist/PCP to manage dosing and coagulation levels.

## 2019-05-15 NOTE — PROGRESS NOTE ADULT - SUBJECTIVE AND OBJECTIVE BOX
SURGERY Progress Note  AMINATA VO    S: Patient seen at bedside.  She stayed in PACU overnight due to nausea/vomiting, now improved.     O:  T(C): 36.8 (05-14-19 @ 23:29), Max: 37.1 (05-14-19 @ 21:10)  HR: 70 (05-14-19 @ 23:29) (69 - 81)  BP: 108/64 (05-14-19 @ 23:29) (103/54 - 146/79)  RR: 18 (05-14-19 @ 23:29) (16 - 19)  SpO2: 96% (05-14-19 @ 23:29) (94% - 100%)      PE:  AOx3 NAD  Unlabored breathing  Abdomen soft, nontender, non distended  R inguinal incision dressing blood tinged, no evidence active bleeding or hematoma, sandbag on incision   Ext WWP    Labs:  CBC (05-14 @ 18:55)                              13.5                           11.6<H>  )----------------(  227        --    % Neutrophils, --    % Lymphocytes, ANC: --                                  39.2                  BMP (05-14 @ 18:55)             136     |  99      |  13    		Ca++ --      Ca 9.0                ---------------------------------( 123<H>		Mg --                 5.0     |  26      |  1.33<H>			Ph --          Coags (05-14 @ 12:17)  aPTT -- / INR 1.48<H> / PT 17.2<H> SURGERY Progress Note  AMINATA VO    S: Patient seen at bedside.  Doing well postoperatively sandbag on incision, no signs of hemodynamic instability.     O:  T(C): 36.8 (05-14-19 @ 23:29), Max: 37.1 (05-14-19 @ 21:10)  HR: 70 (05-14-19 @ 23:29) (69 - 81)  BP: 108/64 (05-14-19 @ 23:29) (103/54 - 146/79)  RR: 18 (05-14-19 @ 23:29) (16 - 19)  SpO2: 96% (05-14-19 @ 23:29) (94% - 100%)      PE:  AOx3 NAD  Unlabored breathing  Abdomen soft, nontender, non distended  R inguinal incision dressing blood tinged, no evidence active bleeding or hematoma, sandbag on incision   Ext WWP    Labs:  CBC (05-14 @ 18:55)                              13.5                           11.6<H>  )----------------(  227        --    % Neutrophils, --    % Lymphocytes, ANC: --                                  39.2                  BMP (05-14 @ 18:55)             136     |  99      |  13    		Ca++ --      Ca 9.0                ---------------------------------( 123<H>		Mg --                 5.0     |  26      |  1.33<H>			Ph --          Coags (05-14 @ 12:17)  aPTT -- / INR 1.48<H> / PT 17.2<H>

## 2019-05-15 NOTE — DISCHARGE NOTE PROVIDER - CARE PROVIDER_API CALL
Charles Leo)  Surgery  [address], [address]  [place] [address]  Phone: [phone]  Fax: [phone]  Follow Up Time:

## 2019-05-15 NOTE — DISCHARGE NOTE PROVIDER - NSDCACTIVITY_GEN_ALL_CORE
Walking - Indoors allowed/No heavy lifting/straining No heavy lifting/straining/Walking - Indoors allowed/Showering allowed

## 2019-05-15 NOTE — DISCHARGE NOTE PROVIDER - HOSPITAL COURSE
63 y/o male with pmhx of Afib s/p PPM placement in 2006, bicuspid aortic valve s/p mechanical valve replacement in 1990 & 2003 now on Coumadin (last INR 3.0), HTN, anxiety/depression, reports experiencing right groin bulging for 6 months with only recent pain/burning sensation. No changes to bowel/bladder habits.         He underwent open repair of right inguinal hernia with mesh placement.  Noted to have slight oozing postoperatively.  Given his anticoagulation status, he was admitted for observation overnight with bedrest and sandbag placed to provide pressure.          POD#1 he remained stable, endorsed minimal incisional pain, wound with mild swelling, soft.  Incision was mildly saturated.  His AM CBC was stable and repeat PM Hgb/HCT was also stable.  He was placed back on Therapeutic lovenox the morning of POD#1.         At the time of discharge the patient was tolerating PO diet, passing flatus, pain controlled with PO Rx, and had stable labs/clinical exam.  He was instructed to restart his Coumadin tomorrow evening. 65 y/o male with pmhx of Afib s/p PPM placement in 2006, bicuspid aortic valve s/p mechanical valve replacement in 1990 & 2003 now on Coumadin (last INR 3.0), HTN, anxiety/depression, reports experiencing right groin bulging for 6 months with only recent pain/burning sensation. No changes to bowel/bladder habits.         He underwent open repair of right inguinal hernia with mesh placement.  Noted to have slight oozing postoperatively.  Given his anticoagulation status, he was admitted for observation overnight with bedrest and sandbag placed to provide pressure.          POD#1 he remained stable, endorsed minimal incisional pain, wound with mild swelling,/hematoma,  soft.  Incision was mildly saturated.  His AM CBC was stable and repeat PM Hgb/HCT was also stable.  He was placed back on Therapeutic lovenox the morning of POD#1.  Rechecked CBC and wound at 12pm showing overall stable hematoma size, soft, no induration.          At the time of discharge the patient was tolerating PO diet, passing flatus, pain controlled with PO Rx, and had stable labs/clinical exam.  He was instructed to restart his Coumadin tomorrow evening. 63 y/o male with pmhx of Afib s/p PPM placement in 2006, bicuspid aortic valve s/p mechanical valve replacement in 1990 & 2003 now on Coumadin (last INR 3.0), HTN, anxiety/depression, reports experiencing right groin bulging for 6 months with only recent pain/burning sensation. No changes to bowel/bladder habits.         He underwent open repair of right inguinal hernia with mesh placement.  Noted to have slight oozing postoperatively.  Given his anticoagulation status, he was admitted for observation overnight with bedrest and sandbag placed to provide pressure.          POD#1 he remained stable, endorsed minimal incisional pain, wound with mild swelling,/hematoma,  soft.  Incision was mildly saturated.  His AM CBC was stable and repeat PM Hgb/HCT was also stable.  He was placed back on Therapeutic lovenox the morning of POD#1.  Rechecked CBC and wound at 12pm showing overall stable hematoma size, soft, no induration.          At the time of discharge the patient was tolerating PO diet, passing flatus, pain controlled with PO Rx, and had stable labs/clinical exam.  He was instructed to restart his Coumadin tomorrow evening and bridge with therapeutic lovenox.

## 2019-05-15 NOTE — PROGRESS NOTE ADULT - ASSESSMENT
A/P: 64M s/p Right inguinal hernia repair (5/15).   - Continue T Lovenox for AVR  - Bedrest O/N, OOB chair today  - Sandbag on incision site.  Check for hematoma  - Continue regular diet  - Monitor UOP    [facility]  [id number]

## 2019-05-15 NOTE — DISCHARGE NOTE PROVIDER - NSDCFUADDINST_GEN_ALL_CORE_FT
Please do not participate in strenuous activity, no excessive flexing of hip or lifting anything > 15-20 lbs.

## 2019-05-16 ENCOUNTER — EMERGENCY (EMERGENCY)
Facility: HOSPITAL | Age: 64
LOS: 1 days | Discharge: HOME | End: 2019-05-16
Attending: EMERGENCY MEDICINE
Payer: MEDICARE

## 2019-05-16 ENCOUNTER — TRANSCRIPTION ENCOUNTER (OUTPATIENT)
Age: 64
End: 2019-05-16

## 2019-05-16 VITALS
OXYGEN SATURATION: 100 % | DIASTOLIC BLOOD PRESSURE: 68 MMHG | RESPIRATION RATE: 16 BRPM | HEART RATE: 70 BPM | SYSTOLIC BLOOD PRESSURE: 132 MMHG

## 2019-05-16 VITALS
OXYGEN SATURATION: 96 % | DIASTOLIC BLOOD PRESSURE: 69 MMHG | RESPIRATION RATE: 18 BRPM | TEMPERATURE: 98 F | SYSTOLIC BLOOD PRESSURE: 111 MMHG | HEART RATE: 71 BPM

## 2019-05-16 VITALS
DIASTOLIC BLOOD PRESSURE: 71 MMHG | OXYGEN SATURATION: 96 % | HEIGHT: 67 IN | RESPIRATION RATE: 16 BRPM | TEMPERATURE: 98 F | SYSTOLIC BLOOD PRESSURE: 150 MMHG | HEART RATE: 69 BPM | WEIGHT: 160.06 LBS

## 2019-05-16 DIAGNOSIS — Z98.890 OTHER SPECIFIED POSTPROCEDURAL STATES: Chronic | ICD-10-CM

## 2019-05-16 DIAGNOSIS — Z95.4 PRESENCE OF OTHER HEART-VALVE REPLACEMENT: Chronic | ICD-10-CM

## 2019-05-16 LAB
ANION GAP SERPL CALC-SCNC: 9 MMOL/L — SIGNIFICANT CHANGE UP (ref 5–17)
APTT BLD: 35.1 SEC — SIGNIFICANT CHANGE UP (ref 27.5–36.3)
BUN SERPL-MCNC: 16 MG/DL — SIGNIFICANT CHANGE UP (ref 7–23)
CALCIUM SERPL-MCNC: 9 MG/DL — SIGNIFICANT CHANGE UP (ref 8.4–10.5)
CHLORIDE SERPL-SCNC: 99 MMOL/L — SIGNIFICANT CHANGE UP (ref 96–108)
CO2 SERPL-SCNC: 26 MMOL/L — SIGNIFICANT CHANGE UP (ref 22–31)
CREAT SERPL-MCNC: 1.29 MG/DL — SIGNIFICANT CHANGE UP (ref 0.5–1.3)
GLUCOSE SERPL-MCNC: 99 MG/DL — SIGNIFICANT CHANGE UP (ref 70–99)
HCT VFR BLD CALC: 35.1 % — LOW (ref 39–50)
HCT VFR BLD CALC: 35.9 % — LOW (ref 39–50)
HGB BLD-MCNC: 11.9 G/DL — LOW (ref 13–17)
HGB BLD-MCNC: 11.9 G/DL — LOW (ref 13–17)
INR BLD: 1.13 RATIO — SIGNIFICANT CHANGE UP (ref 0.88–1.16)
MCHC RBC-ENTMCNC: 30.6 PG — SIGNIFICANT CHANGE UP (ref 27–34)
MCHC RBC-ENTMCNC: 31.3 PG — SIGNIFICANT CHANGE UP (ref 27–34)
MCHC RBC-ENTMCNC: 33.2 GM/DL — SIGNIFICANT CHANGE UP (ref 32–36)
MCHC RBC-ENTMCNC: 33.8 GM/DL — SIGNIFICANT CHANGE UP (ref 32–36)
MCV RBC AUTO: 92.1 FL — SIGNIFICANT CHANGE UP (ref 80–100)
MCV RBC AUTO: 92.7 FL — SIGNIFICANT CHANGE UP (ref 80–100)
PLATELET # BLD AUTO: 235 K/UL — SIGNIFICANT CHANGE UP (ref 150–400)
PLATELET # BLD AUTO: 239 K/UL — SIGNIFICANT CHANGE UP (ref 150–400)
POTASSIUM SERPL-MCNC: 4.1 MMOL/L — SIGNIFICANT CHANGE UP (ref 3.5–5.3)
POTASSIUM SERPL-SCNC: 4.1 MMOL/L — SIGNIFICANT CHANGE UP (ref 3.5–5.3)
PROTHROM AB SERPL-ACNC: 13 SEC — HIGH (ref 10–12.9)
RBC # BLD: 3.79 M/UL — LOW (ref 4.2–5.8)
RBC # BLD: 3.9 M/UL — LOW (ref 4.2–5.8)
RBC # FLD: 12.2 % — SIGNIFICANT CHANGE UP (ref 10.3–14.5)
RBC # FLD: 12.3 % — SIGNIFICANT CHANGE UP (ref 10.3–14.5)
SODIUM SERPL-SCNC: 134 MMOL/L — LOW (ref 135–145)
WBC # BLD: 10.4 K/UL — SIGNIFICANT CHANGE UP (ref 3.8–10.5)
WBC # BLD: 11.3 K/UL — HIGH (ref 3.8–10.5)
WBC # FLD AUTO: 10.4 K/UL — SIGNIFICANT CHANGE UP (ref 3.8–10.5)
WBC # FLD AUTO: 11.3 K/UL — HIGH (ref 3.8–10.5)

## 2019-05-16 PROCEDURE — 85027 COMPLETE CBC AUTOMATED: CPT

## 2019-05-16 PROCEDURE — 80048 BASIC METABOLIC PNL TOTAL CA: CPT

## 2019-05-16 PROCEDURE — 99284 EMERGENCY DEPT VISIT MOD MDM: CPT

## 2019-05-16 PROCEDURE — 88304 TISSUE EXAM BY PATHOLOGIST: CPT

## 2019-05-16 PROCEDURE — 85730 THROMBOPLASTIN TIME PARTIAL: CPT

## 2019-05-16 PROCEDURE — C1781: CPT

## 2019-05-16 PROCEDURE — 85610 PROTHROMBIN TIME: CPT

## 2019-05-16 PROCEDURE — 99283 EMERGENCY DEPT VISIT LOW MDM: CPT

## 2019-05-16 RX ORDER — OXYCODONE HYDROCHLORIDE 5 MG/1
10 TABLET ORAL ONCE
Refills: 0 | Status: DISCONTINUED | OUTPATIENT
Start: 2019-05-16 | End: 2019-05-16

## 2019-05-16 RX ORDER — OXYCODONE HYDROCHLORIDE 5 MG/1
1 TABLET ORAL
Qty: 12 | Refills: 0
Start: 2019-05-16 | End: 2019-05-17

## 2019-05-16 RX ORDER — LOSARTAN POTASSIUM 100 MG/1
50 TABLET, FILM COATED ORAL
Refills: 0 | Status: DISCONTINUED | OUTPATIENT
Start: 2019-05-16 | End: 2019-05-16

## 2019-05-16 RX ORDER — ACETAMINOPHEN 500 MG
1 TABLET ORAL
Qty: 40 | Refills: 0
Start: 2019-05-16 | End: 2019-05-25

## 2019-05-16 RX ORDER — OXYCODONE HYDROCHLORIDE 5 MG/1
1 TABLET ORAL
Qty: 21 | Refills: 0
Start: 2019-05-16 | End: 2019-05-22

## 2019-05-16 RX ORDER — ENOXAPARIN SODIUM 100 MG/ML
70 INJECTION SUBCUTANEOUS
Qty: 0 | Refills: 0 | DISCHARGE
Start: 2019-05-16

## 2019-05-16 RX ADMIN — OXYCODONE HYDROCHLORIDE 10 MILLIGRAM(S): 5 TABLET ORAL at 22:58

## 2019-05-16 RX ADMIN — OXYCODONE HYDROCHLORIDE 10 MILLIGRAM(S): 5 TABLET ORAL at 02:46

## 2019-05-16 RX ADMIN — OXYCODONE HYDROCHLORIDE 10 MILLIGRAM(S): 5 TABLET ORAL at 10:28

## 2019-05-16 RX ADMIN — Medication 650 MILLIGRAM(S): at 06:33

## 2019-05-16 RX ADMIN — OXYCODONE HYDROCHLORIDE 10 MILLIGRAM(S): 5 TABLET ORAL at 03:16

## 2019-05-16 RX ADMIN — OXYCODONE HYDROCHLORIDE 10 MILLIGRAM(S): 5 TABLET ORAL at 11:28

## 2019-05-16 RX ADMIN — Medication 650 MILLIGRAM(S): at 12:40

## 2019-05-16 RX ADMIN — Medication 650 MILLIGRAM(S): at 07:03

## 2019-05-16 RX ADMIN — LOSARTAN POTASSIUM 50 MILLIGRAM(S): 100 TABLET, FILM COATED ORAL at 06:34

## 2019-05-16 RX ADMIN — LAMOTRIGINE 200 MILLIGRAM(S): 25 TABLET, ORALLY DISINTEGRATING ORAL at 06:33

## 2019-05-16 RX ADMIN — Medication 1 DROP(S): at 06:33

## 2019-05-16 RX ADMIN — ENOXAPARIN SODIUM 70 MILLIGRAM(S): 100 INJECTION SUBCUTANEOUS at 06:33

## 2019-05-16 RX ADMIN — Medication 650 MILLIGRAM(S): at 13:40

## 2019-05-16 NOTE — ED PROVIDER NOTE - PSH
S/P aortic valve replacement with metallic valve  1990 and 2003  S/P exploratory laparotomy  after valve replacement 2003

## 2019-05-16 NOTE — PROGRESS NOTE ADULT - ATTENDING COMMENTS
Patient seen/examined.  Agree w above note and plan and have discussed plan w house staff.  C/o pain, c/o feels groin "fill up" after ambulating.  Wound clean dry, soft; hematoma w/o change 24 hrs, h/h stable.  Examined patient after ambulating (as requested).  No indication recurrence or change hematoma.  Home    Charles Leo MD
Patient seen/examined.  Agree w above note and plan and have discussed plan w house staff.  If no change in size of hematoma by this afternoon, will d/c home, start coumadin tomorrow    Charles Leo MD

## 2019-05-16 NOTE — CONSULT NOTE ADULT - ASSESSMENT
ASSESSMENT: Patient is a 64M discharged earlier today POD 2 from [facility] complicated by hematoma now with worsening groin pain and swelling, now improved after oxycodone and ice packs    PLAN:    - No acute surgical intervention  - Please send patient home with additional oxycodone, instructed to place ice packs to right groin to help with swelling and pain  - Pt should be instructed to follow-up with Dr Leo early next week, call to schedule appointment  - patient consoled on symptoms that should prompt return to ED  - Plan discussed with MIS fellow, Dr Costa, on behalf of Attending, Dr. Kermit Iyer PGY-2  Green team surgery   pager [phone]

## 2019-05-16 NOTE — PROVIDER CONTACT NOTE (OTHER) - ASSESSMENT
Pt resting in bed. Stated that after he came back from his walk, he had increasing RLQ pain and swelling. On palpation, increased swelling from prior assessment. RLQ steris CDI. Pt has feelings of fear that his "hematoma is getting worse" Pt would like to MD to come to bedside for eval. Recommend for pt to keep sandbag over hematoma site.

## 2019-05-16 NOTE — PROGRESS NOTE ADULT - SUBJECTIVE AND OBJECTIVE BOX
Surgery Progress Note    Subjective:  No acute events overnight. Pt complains of pain in groin region and is concerned about size of area. Pt was counseled that after hernia repair, fluid may collect in that area.      Objective:  Gen: AOx3 NAD  Resp: No increased work of breathing  Abdomen: soft, nontender, non distended  R inguinal incision dressing blood tinged, no evidence active bleeding or hematoma, sandbag on incision   Ext WWP        T(C): 36.8 (05-16-19 @ 04:31), Max: 37.1 (05-15-19 @ 14:25)  HR: 70 (05-16-19 @ 04:31) (58 - 70)  BP: 113/66 (05-16-19 @ 04:31) (108/60 - 151/79)  RR: 18 (05-16-19 @ 04:31) (18 - 18)  SpO2: 93% (05-16-19 @ 04:31) (93% - 98%)    05-15-19 @ 07:01  -  05-16-19 @ 07:00  --------------------------------------------------------  IN: 1280 mL / OUT: 3780 mL / NET: -2500 mL        LABS                        11.9   11.3  )-----------( 235      ( 16 May 2019 02:34 )             35.1     05-14    136  |  99  |  13  ----------------------------<  123<H>  5.0   |  26  |  1.33<H>    Ca    9.0      14 May 2019 18:55      PT/INR - ( 14 May 2019 12:17 )   PT: 17.2 sec;   INR: 1.48 ratio

## 2019-05-16 NOTE — ED ADULT NURSE NOTE - NSIMPLEMENTINTERV_GEN_ALL_ED
Implemented All Universal Safety Interventions:  [place] to call system. Call bell, personal items and telephone within reach. Instruct patient to call for assistance. Room bathroom lighting operational. Non-slip footwear when patient is off stretcher. Physically safe environment: no spills, clutter or unnecessary equipment. Stretcher in lowest position, wheels locked, appropriate side rails in place.

## 2019-05-16 NOTE — PROGRESS NOTE ADULT - ASSESSMENT
64M s/p Right inguinal hernia repair (5/15).     - c/w T Lovenox for AVR  - OOB today  - discharge today  - c/w regular diet  - Monitor UOP    Green surgery  [id number]

## 2019-05-16 NOTE — ED PROVIDER NOTE - PHYSICAL EXAMINATION
skin: ecchymoses to right inguinal region extending to right scrotal area (exam performed by Dr. Robin Pereira and chaperone by JESSICA Barrios),

## 2019-05-16 NOTE — ED ADULT NURSE REASSESSMENT NOTE - NS ED NURSE REASSESS COMMENT FT1
pt at nurses station demanding to be sent home with more narcotics, pt states surgery resident promised him more narcotics. surgery resident contacted.

## 2019-05-16 NOTE — DISCHARGE NOTE NURSING/CASE MANAGEMENT/SOCIAL WORK - NSDCDPATPORTLINK_GEN_ALL_CORE
You can access the [facility] Patient Portal, offered by [facility], by registering with the following website: [email or web address]

## 2019-05-16 NOTE — ED ADULT NURSE REASSESSMENT NOTE - NS ED NURSE REASSESS COMMENT FT1
surgery at bedside speaking with pt. family present. ice packs applied to hematoma surgery med student at bedside speaking with pt. family present. ice packs applied to hematoma

## 2019-05-16 NOTE — ED PROVIDER NOTE - PRIOR HOSPITAL/ED VISITS SUMMARY FREE TEXT FOR MDM OBTAINED AND REVIEWED OLD RECORDS QUESTION
Recent admission for R inguinal hernia repair complicated by postoperative hematoma, discharged earlier today

## 2019-05-16 NOTE — ED PROVIDER NOTE - NSFOLLOWUPINSTRUCTIONS_ED_ALL_ED_FT
Follow up with your Primary Care Physician and Surgeon Dr. Leo within the next 2-3 days  Take extra strength Tylenol 1000mg every 6 hours as needed for pain and alternate with Oxycodone prescribed to you. DO NOT CONSUME ALCOHOL OR DRIVE/OPERATE HEAVY MACHINERY WHILE TAKING OXYCODONE  Elevate scrotum and apply ice packs (not directly on the skin) to improve swelling/pain  Return to the Emergency Room if you experience new or worsening symptoms fever, chills, worsening pain, burning while urinating, back pain, nausea vomiting

## 2019-05-16 NOTE — ED PROVIDER NOTE - OBJECTIVE STATEMENT
65 y/o male PMHx Afib s/p PPM placement in 2006, bicuspid aortic valve s/p mechanical valve replacement in 1990 & 2003 now on Coumadin (last INR 3.0), HTN, anxiety/depression s/p right inguinal hernia repair with mesh on 5/14/19 with Dr. Leo now presenting with 63 y/o male PMHx Afib s/p PPM placement in 2006, bicuspid aortic valve s/p mechanical valve replacement in 1990 & 2003 last took coumadin 1 week ago, HTN, anxiety/depression, SDH, s/p right inguinal hernia repair with mesh on 5/14/19 with Dr. Leo discharged this afternoon now presenting with right sided scrotal swelling and pain. Patient stated the pain is severe, has mild improvement with oxycodone 10mg (last took at 5pm) however pain returns shortly, and pain exacerbated with ambulation. Patient stated he was not ambulating so much after the surgery and started ambulating more today. Patient feels testicles are now more swollen than prior to discharge this afternoon. Prior to discharge patient was noted to have right inguinal hematoma by surgical team and H/H stable prior to discharge. Patient spoke to covering surgeon Dr. Carmen prior to ED arrival. Patient denied heavy lifting or trauma since procedure, CP, SOB, abdominal pain, N/V/D, fever chills, urinary freq, dysuria, hematuria

## 2019-05-16 NOTE — ED PROVIDER NOTE - PMH
Afib    Anxiety    Bicuspid aortic valve    Chronic shortness of breath  with exertion  Depression  taking MAOI  Glaucoma    Hepatitis B  remote history; while in [place]  Long term current use of anticoagulant    Mechanical heart valve present  [facility]  - pt is unsure of maker  Pacemaker  [employer] [id number]  implanted 2006 w/ generator change 2014     Right inguinal hernia    Subdural hematoma  remote history

## 2019-05-16 NOTE — ED PROVIDER NOTE - PROGRESS NOTE DETAILS
JESSICA Barrios: paged surgery. JESSICA Barrios: spoke to surgical resident Noni who discussed case with attending and patient cleared for discharge with outpt follow up early next week. ED attending Dr. Pereira aware and agreed with above JESSICA Barrios: spoke to surgical resident Noni who discussed case with attending and patient cleared for discharge with outpt follow up early next week. ED attending Dr. Pereira aware and agreed with above. Patient initially refused oxycodone 10mg however now requesting prior to discharge

## 2019-05-16 NOTE — ED PROVIDER NOTE - NEUROLOGICAL, MLM
Problem: Safety  Goal: Will remain free from injury  Outcome: PROGRESSING AS EXPECTED  Bed locked and in lowest position. Bed alarm on. Treaded socks. Call light and belongings with reach. Patient educated to call for assistance. Pt verbalized understanding. Hourly rounding in place.          Alert and oriented, no focal deficits, no motor or sensory deficits.

## 2019-05-16 NOTE — CONSULT NOTE ADULT - SUBJECTIVE AND OBJECTIVE BOX
GENERAL SURGERY SERVICE (pager - #[phone]) - CONSULT NOTE  --------------------------------------------------------------------------------------------    Patient is a 64y old  Male who presents with a chief complaint of right groin pain    HPI: 63 y/o male PMHx Afib s/p PPM placement in 2006, bicuspid aortic valve s/p mechanical valve replacement in 1990 & 2003 last took coumadin 1 week ago, HTN, anxiety/depression, SDH, s/p right inguinal hernia repair with mesh on 5/14/19 with Dr. Leo discharged this afternoon now presenting with right sided scrotal swelling and pain. Patient stated the pain is severe, has mild improvement with oxycodone 10mg (last took at 5pm) however pain returns shortly, and pain exacerbated with ambulation. Patient stated he was not ambulating so much after the surgery and started ambulating more today. Patient feels testicles are now more swollen than prior to discharge this afternoon. Prior to discharge patient was noted to have right inguinal hematoma by surgical team and H/H stable prior to discharge. Patient spoke to covering surgeon Dr. Carmen prior to ED arrival. Patient denied heavy lifting or trauma since procedure, CP, SOB, abdominal pain, N/V/D, fever chills, urinary freq, dysuria, hematuria. General surgery consulted for further management.     ROS: 10-system review is otherwise negative except HPI above.      PAST MEDICAL & SURGICAL HISTORY:  Glaucoma  Mechanical heart valve present: [facility]  - pt is unsure of maker  Pacemaker: [employer] 1240  implanted 2006 w/ generator change 2014     Hepatitis B: remote history; while in [facility]  Long term current use of anticoagulant  Chronic shortness of breath: with exertion  Depression: taking MAOI  Anxiety  Subdural hematoma: remote history  Bicuspid aortic valve  Afib  Right inguinal hernia  S/P aortic valve replacement with metallic valve: 1990 and 2003  S/P exploratory laparotomy: after valve replacement 2003    FAMILY HISTORY:      ALLERGIES: No Known Allergies      HOME MEDICATIONS:   acetaminophen 325 mg oral tablet: 2 tab(s) orally every 6 hours, As needed, Mild Pain (1 - 3) (15 May 2019 10:16)  Coumadin 5 mg oral tablet: 1 tab(s) orally once a day (14 May 2019 11:58)  enoxaparin: 70 unit(s) injectable 2 times a day (16 May 2019 12:09)  KlonoPIN 0.5 mg oral tablet: 1 tab(s) orally once a day (at bedtime) (14 May 2019 11:58)  LaMICtal 200 mg oral tablet: 1 tab(s) orally once a day (14 May 2019 11:58)  latanoprost 0.005% ophthalmic solution: 1 drop(s) to each affected eye once a day (at bedtime)  BOTH EYES (14 May 2019 11:58)  losartan 50 mg oral tablet: 1 tab(s) orally 2 times a day (14 May 2019 11:58)  Nardil: 30 milligram(s) orally once a day (14 May 2019 11:58)  simvastatin 20 mg oral tablet: 1 tab(s) orally once a day (at bedtime) (14 May 2019 11:58)  timolol hemihydrate 0.5% ophthalmic solution: 1 drop(s) in the left eye once a day (14 May 2019 11:58)      CURRENT MEDICATIONS  MEDICATIONS (STANDING): oxyCODONE    IR 10 milliGRAM(s) Oral Once    MEDICATIONS (PRN):  --------------------------------------------------------------------------------------------    Vitals:   T(C): 36.7 (05-16-19 @ 20:00), Max: 36.9 (05-15-19 @ 23:51)  HR: 69 (05-16-19 @ 20:00) (69 - 71)  BP: 150/71 (05-16-19 @ 20:00) (111/69 - 151/79)  RR: 16 (05-16-19 @ 20:00) (16 - 18)  SpO2: 96% (05-16-19 @ 20:00) (93% - 96%)    Height (cm): 170.18 (05-16 @ 20:00)  Weight (kg): 72.6 (05-16 @ 20:00)  BMI (kg/m2): 25.1 (05-16 @ 20:00)  BSA (m2): 1.84 (05-16 @ 20:00)    PHYSICAL EXAM:   General: NAD  HEENT: Normocephalic, atraumatic, EOMI, PEERLA.  Cardiac:  RRR  Respiratory: Bilateral breath sounds, clear and equal bilaterally  Abdomen: Soft, non-distended, non-tender   Groin: R IHR incision with steri strips in place, with significant ecchymosis along incision extending to right flank, groin and scrotum.   Ext: warm, moving all ext    --------------------------------------------------------------------------------------------    LABS  CBC (05-16 @ 08:21)                              11.9<L>                         10.4    )----------------(  239        --    % Neutrophils, --    % Lymphocytes, ANC: --                                  35.9<L>  CBC (05-16 @ 02:34)                              11.9<L>                         11.3<H>  )----------------(  235        --    % Neutrophils, --    % Lymphocytes, ANC: --                                  35.1<L>    BMP (05-16 @ 08:21)             134<L>  |  99      |  16    		Ca++ --      Ca 9.0                ---------------------------------( 99    		Mg --                 4.1     |  26      |  1.29  			Ph --          Coags (05-16 @ 08:21)  aPTT 35.1 / INR 1.13 / PT 13.0<H>          --------------------------------------------------------------------------------------------    MICROBIOLOGY      --------------------------------------------------------------------------------------------

## 2019-05-16 NOTE — ED PROVIDER NOTE - ATTENDING CONTRIBUTION TO CARE
Patient POD #2 R inguinal hernia repair, complicated by postoperative hematoma.  Was on coumadin transitioned to lovenox prior to surgery for mechanical heart valve.  Discharged today after inpatient course with persisting pains.  Taking oxycodone 10mg at home for pain with some relief but rapidly returning.  Also noted new swelling of testicles today since discharge.    A 14 point review of systems is negative except as in HPI or otherwise documented.    Exam:  General: Patient well appearing, vital signs within normal limits  HEENT: airway patent with moist mucous membranes  Cardiac: strong peripheral pulses  Respiratory: no respiratory distress  GI: abdomen soft, non tender, non distended  : R groin incision with underlying hematoma, some edema of scrotum  Skin: warm, well perfused aside from hematoma/incision  Psych: normal mood and affect    Patient with postoperative hematoma, today with continued pain and scrotal swelling since discharge - suspect possible positional redistibution of hematoma as patient walking more since discharge earlier today.  Plan for surgical consultation in Emergency Department for re-evaluation.

## 2019-05-17 ENCOUNTER — CHART COPY (OUTPATIENT)
Age: 64
End: 2019-05-17

## 2019-05-17 DIAGNOSIS — Z01.818 ENCOUNTER FOR OTHER PREPROCEDURAL EXAMINATION: ICD-10-CM

## 2019-05-17 PROBLEM — Z79.01 LONG TERM (CURRENT) USE OF ANTICOAGULANTS: Chronic | Status: ACTIVE | Noted: 2019-05-07

## 2019-05-17 PROBLEM — Q23.1 CONGENITAL INSUFFICIENCY OF AORTIC VALVE: Chronic | Status: ACTIVE | Noted: 2019-05-07

## 2019-05-17 PROBLEM — H40.9 UNSPECIFIED GLAUCOMA: Chronic | Status: ACTIVE | Noted: 2019-05-07

## 2019-05-17 PROBLEM — I48.91 UNSPECIFIED ATRIAL FIBRILLATION: Chronic | Status: ACTIVE | Noted: 2019-05-07

## 2019-05-17 PROBLEM — K40.90 UNILATERAL INGUINAL HERNIA, WITHOUT OBSTRUCTION OR GANGRENE, NOT SPECIFIED AS RECURRENT: Chronic | Status: ACTIVE | Noted: 2019-05-07

## 2019-05-17 PROBLEM — F41.9 ANXIETY DISORDER, UNSPECIFIED: Chronic | Status: ACTIVE | Noted: 2019-05-07

## 2019-05-17 RX ORDER — AZTREONAM 2 G
1 VIAL (EA) INJECTION
Qty: 10 | Refills: 0
Start: 2019-05-17 | End: 2019-05-21

## 2019-05-20 ENCOUNTER — EMERGENCY (EMERGENCY)
Facility: HOSPITAL | Age: 64
LOS: 1 days | Discharge: HOME | End: 2019-05-20
Attending: EMERGENCY MEDICINE
Payer: MEDICARE

## 2019-05-20 VITALS
HEART RATE: 69 BPM | DIASTOLIC BLOOD PRESSURE: 67 MMHG | OXYGEN SATURATION: 98 % | SYSTOLIC BLOOD PRESSURE: 121 MMHG | RESPIRATION RATE: 17 BRPM | TEMPERATURE: 98 F

## 2019-05-20 VITALS
TEMPERATURE: 98 F | RESPIRATION RATE: 16 BRPM | SYSTOLIC BLOOD PRESSURE: 124 MMHG | WEIGHT: 160.06 LBS | HEART RATE: 70 BPM | OXYGEN SATURATION: 98 % | HEIGHT: 67 IN | DIASTOLIC BLOOD PRESSURE: 66 MMHG

## 2019-05-20 DIAGNOSIS — Z98.890 OTHER SPECIFIED POSTPROCEDURAL STATES: Chronic | ICD-10-CM

## 2019-05-20 DIAGNOSIS — Z95.4 PRESENCE OF OTHER HEART-VALVE REPLACEMENT: Chronic | ICD-10-CM

## 2019-05-20 LAB
ANION GAP SERPL CALC-SCNC: 10 MMOL/L — SIGNIFICANT CHANGE UP (ref 5–17)
ANION GAP SERPL CALC-SCNC: 11 MMOL/L — SIGNIFICANT CHANGE UP (ref 5–17)
ANION GAP SERPL CALC-SCNC: 8 MMOL/L — SIGNIFICANT CHANGE UP (ref 5–17)
APTT BLD: 40.9 SEC — HIGH (ref 27.5–36.3)
BASOPHILS # BLD AUTO: 0 K/UL — SIGNIFICANT CHANGE UP (ref 0–0.2)
BASOPHILS NFR BLD AUTO: 0.1 % — SIGNIFICANT CHANGE UP (ref 0–2)
BUN SERPL-MCNC: 16 MG/DL — SIGNIFICANT CHANGE UP (ref 7–23)
BUN SERPL-MCNC: 16 MG/DL — SIGNIFICANT CHANGE UP (ref 7–23)
BUN SERPL-MCNC: 17 MG/DL — SIGNIFICANT CHANGE UP (ref 7–23)
CALCIUM SERPL-MCNC: 8.4 MG/DL — SIGNIFICANT CHANGE UP (ref 8.4–10.5)
CALCIUM SERPL-MCNC: 8.8 MG/DL — SIGNIFICANT CHANGE UP (ref 8.4–10.5)
CALCIUM SERPL-MCNC: 9.3 MG/DL — SIGNIFICANT CHANGE UP (ref 8.4–10.5)
CHLORIDE SERPL-SCNC: 101 MMOL/L — SIGNIFICANT CHANGE UP (ref 96–108)
CHLORIDE SERPL-SCNC: 103 MMOL/L — SIGNIFICANT CHANGE UP (ref 96–108)
CHLORIDE SERPL-SCNC: 96 MMOL/L — SIGNIFICANT CHANGE UP (ref 96–108)
CO2 SERPL-SCNC: 19 MMOL/L — LOW (ref 22–31)
CO2 SERPL-SCNC: 21 MMOL/L — LOW (ref 22–31)
CO2 SERPL-SCNC: 21 MMOL/L — LOW (ref 22–31)
CREAT SERPL-MCNC: 1.52 MG/DL — HIGH (ref 0.5–1.3)
CREAT SERPL-MCNC: 1.54 MG/DL — HIGH (ref 0.5–1.3)
CREAT SERPL-MCNC: 1.63 MG/DL — HIGH (ref 0.5–1.3)
EOSINOPHIL # BLD AUTO: 1.4 K/UL — HIGH (ref 0–0.5)
EOSINOPHIL NFR BLD AUTO: 15.4 % — HIGH (ref 0–6)
GLUCOSE SERPL-MCNC: 92 MG/DL — SIGNIFICANT CHANGE UP (ref 70–99)
GLUCOSE SERPL-MCNC: 93 MG/DL — SIGNIFICANT CHANGE UP (ref 70–99)
GLUCOSE SERPL-MCNC: 94 MG/DL — SIGNIFICANT CHANGE UP (ref 70–99)
HCT VFR BLD CALC: 32.1 % — LOW (ref 39–50)
HGB BLD-MCNC: 10.9 G/DL — LOW (ref 13–17)
INR BLD: 2.17 RATIO — HIGH (ref 0.88–1.16)
LYMPHOCYTES # BLD AUTO: 1.5 K/UL — SIGNIFICANT CHANGE UP (ref 1–3.3)
LYMPHOCYTES # BLD AUTO: 16.6 % — SIGNIFICANT CHANGE UP (ref 13–44)
MCHC RBC-ENTMCNC: 31.3 PG — SIGNIFICANT CHANGE UP (ref 27–34)
MCHC RBC-ENTMCNC: 33.9 GM/DL — SIGNIFICANT CHANGE UP (ref 32–36)
MCV RBC AUTO: 92.3 FL — SIGNIFICANT CHANGE UP (ref 80–100)
MONOCYTES # BLD AUTO: 1 K/UL — HIGH (ref 0–0.9)
MONOCYTES NFR BLD AUTO: 11.6 % — SIGNIFICANT CHANGE UP (ref 2–14)
NEUTROPHILS # BLD AUTO: 5.1 K/UL — SIGNIFICANT CHANGE UP (ref 1.8–7.4)
NEUTROPHILS NFR BLD AUTO: 56.2 % — SIGNIFICANT CHANGE UP (ref 43–77)
PLATELET # BLD AUTO: 311 K/UL — SIGNIFICANT CHANGE UP (ref 150–400)
POTASSIUM SERPL-MCNC: 5.6 MMOL/L — HIGH (ref 3.5–5.3)
POTASSIUM SERPL-MCNC: 5.9 MMOL/L — HIGH (ref 3.5–5.3)
POTASSIUM SERPL-MCNC: 7 MMOL/L — CRITICAL HIGH (ref 3.5–5.3)
POTASSIUM SERPL-SCNC: 5.6 MMOL/L — HIGH (ref 3.5–5.3)
POTASSIUM SERPL-SCNC: 5.9 MMOL/L — HIGH (ref 3.5–5.3)
POTASSIUM SERPL-SCNC: 7 MMOL/L — CRITICAL HIGH (ref 3.5–5.3)
PROTHROM AB SERPL-ACNC: 25.6 SEC — HIGH (ref 10–12.9)
RBC # BLD: 3.48 M/UL — LOW (ref 4.2–5.8)
RBC # FLD: 12.2 % — SIGNIFICANT CHANGE UP (ref 10.3–14.5)
SODIUM SERPL-SCNC: 128 MMOL/L — LOW (ref 135–145)
SODIUM SERPL-SCNC: 130 MMOL/L — LOW (ref 135–145)
SODIUM SERPL-SCNC: 132 MMOL/L — LOW (ref 135–145)
WBC # BLD: 9 K/UL — SIGNIFICANT CHANGE UP (ref 3.8–10.5)
WBC # FLD AUTO: 9 K/UL — SIGNIFICANT CHANGE UP (ref 3.8–10.5)

## 2019-05-20 PROCEDURE — 85610 PROTHROMBIN TIME: CPT

## 2019-05-20 PROCEDURE — 99284 EMERGENCY DEPT VISIT MOD MDM: CPT | Mod: 25

## 2019-05-20 PROCEDURE — 85027 COMPLETE CBC AUTOMATED: CPT

## 2019-05-20 PROCEDURE — 74176 CT ABD & PELVIS W/O CONTRAST: CPT | Mod: 26

## 2019-05-20 PROCEDURE — 80048 BASIC METABOLIC PNL TOTAL CA: CPT

## 2019-05-20 PROCEDURE — 74176 CT ABD & PELVIS W/O CONTRAST: CPT

## 2019-05-20 PROCEDURE — 99284 EMERGENCY DEPT VISIT MOD MDM: CPT

## 2019-05-20 PROCEDURE — 96360 HYDRATION IV INFUSION INIT: CPT

## 2019-05-20 PROCEDURE — 85730 THROMBOPLASTIN TIME PARTIAL: CPT

## 2019-05-20 PROCEDURE — 96372 THER/PROPH/DIAG INJ SC/IM: CPT | Mod: XU

## 2019-05-20 RX ORDER — ENOXAPARIN SODIUM 100 MG/ML
80 INJECTION SUBCUTANEOUS ONCE
Refills: 0 | Status: COMPLETED | OUTPATIENT
Start: 2019-05-20 | End: 2019-05-20

## 2019-05-20 RX ORDER — SODIUM CHLORIDE 9 MG/ML
1000 INJECTION INTRAMUSCULAR; INTRAVENOUS; SUBCUTANEOUS ONCE
Refills: 0 | Status: COMPLETED | OUTPATIENT
Start: 2019-05-20 | End: 2019-05-20

## 2019-05-20 RX ADMIN — SODIUM CHLORIDE 1000 MILLILITER(S): 9 INJECTION INTRAMUSCULAR; INTRAVENOUS; SUBCUTANEOUS at 18:23

## 2019-05-20 RX ADMIN — ENOXAPARIN SODIUM 80 MILLIGRAM(S): 100 INJECTION SUBCUTANEOUS at 20:20

## 2019-05-20 RX ADMIN — SODIUM CHLORIDE 1000 MILLILITER(S): 9 INJECTION INTRAMUSCULAR; INTRAVENOUS; SUBCUTANEOUS at 17:04

## 2019-05-20 NOTE — ED PROVIDER NOTE - OBJECTIVE STATEMENT
64M w/ pmh Afib s/p PPM placement in 2006, bicuspid aortic valve s/p mechanical valve replacement in 1990 & 2003 last took coumadin 1 week ago, HTN, anxiety/depression, SDH, s/p right inguinal hernia repair with mesh on 5/14/19 with Dr. Leo - presents with intermittent bleeding from 64M w/ pmh Afib s/p PPM placement in 2006, bicuspid aortic valve s/p mechanical valve replacement in 1990 & 2003 last took coumadin 1 week ago, HTN, anxiety/depression, SDH, s/p right inguinal hernia repair with mesh on 5/14/19 with Dr. Leo - presents with intermittent bleeding from incision site. Patient remains having pain and swelling over RLQ abd, unchanged from prior. Seen in ED 5/16 for R scrotal pain and swelling which has improved. took oxycodone 7am today, declines at this time.    Surg: Ahmet

## 2019-05-20 NOTE — ED PROVIDER NOTE - CARE PROVIDER_API CALL
Charles Leo)  Surgery  [address], [address]  [place] [address]  Phone: [phone]  Fax: [phone]  Follow Up Time:

## 2019-05-20 NOTE — ED PROVIDER NOTE - NSFOLLOWUPINSTRUCTIONS_ED_ALL_ED_FT
F/U with Dr. Leo. Pain control with acetaminophen and oxycodone PRN. If you develop intense pain that is unbearable, or new sustained fevers, please return to the ED.

## 2019-05-20 NOTE — CONSULT NOTE ADULT - ASSESSMENT
ASSESSMENT: Patient is a 64M pmhx Afib, PPM ('06), AVR ('90 & '03) on coumadin, HTN, anxiety/depression, who had a right inguinal hernia repair on 5/14/19 that was complicated by post-op groin hematoma, now presents with possible bleeding from surgical site.    PLAN:   - CT right groin ordered, further plans pending result    Discussed with Dr. Ahmet Collins, PGY-2  [facility] [phone] ASSESSMENT: Patient is a 64M pmhx Afib, PPM ('06), AVR ('90 & '03) on coumadin, HTN, anxiety/depression, who had a right inguinal hernia repair on 5/14/19 that was complicated by post-op groin hematoma, now presents with possible bleeding from surgical site.    PLAN:   - CT right groin ordered, further plans pending result: CT scan imaging and read reviewed with Dr. Leo  - No urgent intervention indicated at the moment, patient can be discharged home with outpatient follow up; please call office tomorrow to set up the appointment for either Wednesday or Thursday     Discussed with Dr. Ahmet Collins, PGY-2  [facility] [phone]

## 2019-05-20 NOTE — ED PROVIDER NOTE - CARE PLAN
Principal Discharge DX:	Abdominal hematoma Principal Discharge DX:	Abdominal hematoma  Secondary Diagnosis:	Hyperkalemia  Secondary Diagnosis:	Renal insufficiency

## 2019-05-20 NOTE — ED ADULT NURSE NOTE - OBJECTIVE STATEMENT
65 y/o M, A&Ox4, enters ED w/ c/o pain/bleeding from incision site. Pt. reports he had a R. inguinal hernia repair on 5/14 w/ MD Leo. Pt. reports he has had intermittent bleeding from the incision site and also endorses pain from site and around the area. Pt. presents w/ a hematoma to the suprapubic region - swelling noted over RLQ and suprapubic area. Abdomen otherwise soft, round, nontender. Pt. also reports scrotal pain and reports he was seen in the ED on 5/16 for the pain/swelling, but reports swelling has improved. No active bleeding from site. Incision site is well approximated and well healing. No drainage. Minor erythema noted. Pt. reports he has been taking Tylenol and Oxycodone w/ relief. Pt. last took Tylenol at 1100 and last took Oxycodone at 0700. Pt. on coumadin for mechanical valve replacement, but reports he last took coumadin 1 week ago. Pt. also reports he is on Lovenox. Pt. does not want anything for pain at this time. No fever/chills. No n/v. No chest pain. No SOB. Skin warm, dry and intact. Safety and comfort provided. Friend at bedside. 65 y/o M, A&Ox4, enters ED w/ c/o pain/bleeding from incision site. Pt. reports he had a R. inguinal hernia repair on 5/14 w/ MD Leo. Pt. reports he has had intermittent bleeding from the incision site and also endorses pain from site and around the area. Pt. presents w/ a hematoma to the suprapubic region - swelling noted over RLQ and suprapubic area. Pt. reports a "pressure-like" feeling. Abdomen otherwise soft, round, nontender. Pt. also reports scrotal pain and reports he was seen in the ED on 5/16 for the pain/swelling, but reports swelling has improved. No active bleeding from site. Incision site is well approximated and well healing. No drainage. Minor erythema noted. Pt. reports he has been taking Tylenol and Oxycodone w/ relief. Pt. last took Tylenol at 1100 and last took Oxycodone at 0700. Pt. on coumadin for mechanical valve replacement, but reports he last took coumadin 1 week ago. Pt. also reports he is on Lovenox. Pt. does not want anything for pain at this time. No fever/chills. No n/v. No chest pain. No SOB. Skin warm, dry and intact. Safety and comfort provided. Friend at bedside. 63 y/o M, A&Ox4, enters ED w/ c/o pain/bleeding from incision site. Pt. reports he had a R. inguinal hernia repair on 5/14 w/ MD Leo. Pt. reports he has had intermittent bleeding from the incision site and also endorses pain from site and around the area. Pt. presents w/ a hematoma to the suprapubic region - swelling noted over RLQ and suprapubic area. Suprapubic region hard to touch. Pt. reports a "pressure-like" feeling. Abdomen otherwise soft, round, nontender. Pt. also reports scrotal pain and reports he was seen in the ED on 5/16 for the pain/swelling, but reports swelling has improved. No active bleeding from site. Incision site is well approximated and well healing. No drainage. Minor erythema noted. Pt. reports he has been taking Tylenol and Oxycodone w/ relief. Pt. last took Tylenol at 1100 and last took Oxycodone at 0700. Pt. on coumadin for mechanical valve replacement, but reports he last took coumadin 1 week ago. Pt. also reports he is on Lovenox. Pt. does not want anything for pain at this time. No fever/chills. No n/v. No chest pain. No SOB. Skin warm, dry and intact. Safety and comfort provided. Friend at bedside.

## 2019-05-20 NOTE — ED ADULT NURSE REASSESSMENT NOTE - NS ED NURSE REASSESS COMMENT FT1
Report received from Doug HOGUE . Pt AAOx4, NAD, resp nonlabored, skin warm/dry, resting comfortably in bed with family at bedside. Pt c/o wanting to go home and feeling hungry. Patient provided with meal tray, and repeat bloodwork completed.  Pt denies headache, dizziness, chest pain, palpitations, SOB, abd pain, n/v/d, urinary symptoms, fevers, chills, weakness at this time. Pt awaiting lab results . Safety maintained.

## 2019-05-20 NOTE — ED PROVIDER NOTE - ATTENDING CONTRIBUTION TO CARE
63 y/o male with the above documented history and HPI who on exam appears well and comfortable. VSs noted, sclerae anicteric, MM's moist, neck supple, lungs CTA, cardiac sounds s/ audible m/r/g, abdomen soft, ND c/ ecchymosis and tenderness in its R inguinal and suprapubic regions c/ an intact R inguinal incisional scar, extremities s/ asymmetry, skin s/ rash or jaundice and neurologically intact. Screening studies obtained at Gen Surg's request. Their plan will be ours c/ nothing clinically evident to suggest any alternative or additional acute or emergent process.

## 2019-05-20 NOTE — ED PROVIDER NOTE - PROGRESS NOTE DETAILS
Called by Gen Surg. Request CT. Eduardo Dupont PGY2: ct performed pending read, called rads will call back if abnormal finding CT a/p performed showing abdominal wall hematoma. Per surgery no urgent intervention required, ok to be discharged with f/u with Dr. Leo in several days. Repeat BMP with K of 5.6, pt explained benefits of Kayexalate but pt did nto want additional medications. Also stated that he has pain medication at home; will not be discharged with additional pain medication.

## 2019-05-20 NOTE — ED PROVIDER NOTE - PHYSICAL EXAMINATION
*GEN:   uncomfortable, in no acute distress, AOx3  *EYES:   pupils equally round and reactive to light, extra-occular movements intact  *HEENT:   airway patent, moist mucosal membranes, full ROM neck  *CV:   regular rate and rhythm  *RESP:   clear to auscultation bilaterally, non-labored  *ABD:   RLQ surgical incision site, clean dry intact w/out active bleeding, tender to palpation throughout and tense appearing, w/ scattered hematoma  *:   no cva/flank tenderness  *MSK:   no MSK tenderness or limited ROM  *SKIN:   dry, intact  *NEURO:   AOx3, no focal weakness or loss of sensation

## 2019-05-20 NOTE — CONSULT NOTE ADULT - SUBJECTIVE AND OBJECTIVE BOX
GENERAL SURGERY CONSULT NOTE  --------------------------------------------------------------------------------------------    HPI:  64M pmhx Afib, PPM ('06), AVR ('90 & '03) on coumadin, HTN, anxiety/depression, who had a right inguinal hernia repair on 5/14/19 that was complicated by post-op groin hematoma requiring extended hospital stay. The patient presents to the ED today with report that some bright red blood leaked from his right groin incision site last night. He says his groin hematoma has not grown, but may have become more spread out across his abdomen & groin. He reports normal bowel function, and denies n/v/d.       PAST MEDICAL & SURGICAL HISTORY:  Glaucoma  Mechanical heart valve present: [facility]  - pt is unsure of maker  Pacemaker: [employer] 1240  implanted 2006 w/ generator change 2014   Hepatitis B: remote history; while in [facility]  Long term current use of anticoagulant  Chronic shortness of breath: with exertion  Depression: taking MAOI  Anxiety  Subdural hematoma: remote history  Bicuspid aortic valve  Afib  Right inguinal hernia  S/P aortic valve replacement with metallic valve: 1990 and 2003  S/P exploratory laparotomy: after valve replacement 2003      FAMILY HISTORY:  Family history not pertinent as reviewed with the patient and family      ALLERGIES: No Known Allergies      CURRENT MEDICATIONS  MEDICATIONS (STANDING):   MEDICATIONS (PRN):  --------------------------------------------------------------------------------------------    Vitals:   T(C): 36.6 (05-20-19 @ 15:45), Max: 36.6 (05-20-19 @ 13:54)  HR: 70 (05-20-19 @ 15:45) (70 - 70)  BP: 124/69 (05-20-19 @ 15:45) (124/66 - 124/69)  RR: 18 (05-20-19 @ 15:45) (16 - 18)  SpO2: 99% (05-20-19 @ 15:45) (98% - 99%)  CAPILLARY BLOOD GLUCOSE    Height (cm): 170.18 (05-20 @ 13:54)  Weight (kg): 72.6 (05-20 @ 13:54)  BMI (kg/m2): 25.1 (05-20 @ 13:54)  BSA (m2): 1.84 (05-20 @ 13:54)      PHYSICAL EXAM:   General: NAD, Lying in bed comfortably  Neuro: A+Ox3  HEENT: NC/AT, EOMI  Neck: Soft, supple  Cardio: RRR, nml S1/S2  Resp: Good effort, CTA b/l  GI/Abd: Soft, NT/ND. Palpable right groin hematoma. Surgical incision appears intact with small dried blood with no apparent opening/dehiscence.   Vascular: All 4 extremities warm.  Skin: Intact, no breakdown  Musculoskeletal: All 4 extremities moving spontaneously, no limitations  --------------------------------------------------------------------------------------------    LABS  CBC (05-20 @ 15:56)                              10.9<L>                         9.0     )----------------(  311        56.2  % Neutrophils, 16.6  % Lymphocytes, ANC: 5.1                                 32.1<L>    BMP (05-20 @ 15:56)             128<L>  |  96      |  17    		Ca++ --      Ca 9.3                ---------------------------------( 94    		Mg --                 5.9<H>  |  21<L>   |  1.63<H>			Ph --          Coags (05-20 @ 15:56)  aPTT 40.9<H> / INR 2.17<H> / PT 25.6<H>          --------------------------------------------------------------------------------------------    MICROBIOLOGY      --------------------------------------------------------------------------------------------    IMAGING  CT a/p pending

## 2019-05-20 NOTE — ED PROVIDER NOTE - PMH
Afib    Anxiety    Bicuspid aortic valve    Chronic shortness of breath  with exertion  Depression  taking MAOI  Glaucoma    Hepatitis B  remote history; while in [place]  Long term current use of anticoagulant    Mechanical heart valve present  [facility]  - pt is unsure of maker  Pacemaker  [employer] 1240  implanted 2006 w/ generator change 2014     Right inguinal hernia    Subdural hematoma  remote history

## 2019-05-22 ENCOUNTER — APPOINTMENT (OUTPATIENT)
Dept: SURGERY | Facility: CLINIC | Age: 64
End: 2019-05-22
Payer: MEDICARE

## 2019-05-22 LAB — SURGICAL PATHOLOGY STUDY: SIGNIFICANT CHANGE UP

## 2019-05-22 PROCEDURE — 99024 POSTOP FOLLOW-UP VISIT: CPT

## 2019-05-22 NOTE — HISTORY OF PRESENT ILLNESS
[de-identified] : This patient developed a postoperative hematoma in the right groin. He was seen in the emergency room at which time he was CAT scanned.  A large hematoma was noted in the right groin extending to the right rectus muscle.  Today the patient states that his pain is getting better slowly.  He requests more narcotic analgesics.  He is quite unhappy about the postoperative hematoma and we had an extensive discussion about the pros and cons of operating in the face of anticoagulation. He understands that anticoagulation was necessary to protect his heart. He is still a loss to understand how this bleeding occurred.

## 2019-05-22 NOTE — PHYSICAL EXAM
[de-identified] : There is extensive soft tissue swelling beneath the wound and ecchymosis about the wound extending onto the right thigh and right hip. The right testicle is mildly tender and there is minimal swelling the scrotum at this point

## 2019-05-22 NOTE — PLAN
[FreeTextEntry1] : The patient has been reassured that the hematoma will ultimately be reabsorbed by his tissues. I will see him again in 2 weeks.

## 2019-05-22 NOTE — REASON FOR VISIT
[Post Op: _________] : a [unfilled] post op visit [FreeTextEntry1] : Right inguinal hernia repair with mesh.

## 2019-05-25 ENCOUNTER — MOBILE ON CALL (OUTPATIENT)
Age: 64
End: 2019-05-25

## 2019-05-29 ENCOUNTER — APPOINTMENT (OUTPATIENT)
Dept: SURGERY | Facility: CLINIC | Age: 64
End: 2019-05-29
Payer: MEDICARE

## 2019-05-29 VITALS
TEMPERATURE: 98.4 F | HEIGHT: 66.5 IN | WEIGHT: 160 LBS | BODY MASS INDEX: 25.41 KG/M2 | HEART RATE: 76 BPM | RESPIRATION RATE: 16 BRPM | OXYGEN SATURATION: 97 % | DIASTOLIC BLOOD PRESSURE: 70 MMHG | SYSTOLIC BLOOD PRESSURE: 124 MMHG

## 2019-05-29 PROCEDURE — 10160 PNXR ASPIR ABSC HMTMA BULLA: CPT | Mod: 58

## 2019-05-29 PROCEDURE — 99024 POSTOP FOLLOW-UP VISIT: CPT

## 2019-05-29 RX ORDER — OXYCODONE 5 MG/1
5 TABLET ORAL
Qty: 20 | Refills: 0 | Status: DISCONTINUED | COMMUNITY
Start: 2019-05-22 | End: 2019-05-29

## 2019-05-29 NOTE — PHYSICAL EXAM
[de-identified] : The tissues of the right groin are remarkably less swollen. The wound is clean and there is some serosanguineous fluid noted on the dressing. The right testicle is normal.\par \par Under sterile conditions an 18-gauge needle was introduced into the subcutaneous tissues. No fluid was aspirated. Pressure was applied and no bleeding ensued

## 2019-05-29 NOTE — HISTORY OF PRESENT ILLNESS
[de-identified] : Today the patient states that he is pain-free. The wound has been leaking dark fluid intermittently. He requests aspiration of this fluid collection [de-identified] : This patient developed a postoperative hematoma in the right groin. He was seen in the emergency room at which time he was CAT scanned.  A large hematoma was noted in the right groin extending to the right rectus muscle.  Today the patient states that his pain is getting better slowly.  He requests more narcotic analgesics.  He is quite unhappy about the postoperative hematoma and we had an extensive discussion about the pros and cons of operating in the face of anticoagulation. He understands that anticoagulation was necessary to protect his heart. He is still a loss to understand how this bleeding occurred. Last seen on 05/22/19.

## 2019-05-29 NOTE — PLAN
[FreeTextEntry1] : I reassured this patient again that the soft tissue swelling and hematoma will be resolving in the coming weeks. I will see him again in 2 weeks.

## 2019-06-10 ENCOUNTER — APPOINTMENT (OUTPATIENT)
Dept: INTERNAL MEDICINE | Facility: CLINIC | Age: 64
End: 2019-06-10
Payer: MEDICARE

## 2019-06-10 VITALS — DIASTOLIC BLOOD PRESSURE: 70 MMHG | SYSTOLIC BLOOD PRESSURE: 128 MMHG

## 2019-06-10 VITALS
RESPIRATION RATE: 18 BRPM | HEIGHT: 66 IN | TEMPERATURE: 97.8 F | HEART RATE: 78 BPM | BODY MASS INDEX: 25.23 KG/M2 | WEIGHT: 157 LBS | DIASTOLIC BLOOD PRESSURE: 74 MMHG | OXYGEN SATURATION: 97 % | SYSTOLIC BLOOD PRESSURE: 132 MMHG

## 2019-06-10 DIAGNOSIS — H40.9 UNSPECIFIED GLAUCOMA: ICD-10-CM

## 2019-06-10 DIAGNOSIS — T14.8XXA OTHER INJURY OF UNSPECIFIED BODY REGION, INITIAL ENCOUNTER: ICD-10-CM

## 2019-06-10 DIAGNOSIS — Z95.0 PRESENCE OF CARDIAC PACEMAKER: ICD-10-CM

## 2019-06-10 PROCEDURE — 94010 BREATHING CAPACITY TEST: CPT

## 2019-06-10 PROCEDURE — 85610 PROTHROMBIN TIME: CPT | Mod: QW

## 2019-06-10 PROCEDURE — G0438: CPT | Mod: 25

## 2019-06-10 PROCEDURE — 93000 ELECTROCARDIOGRAM COMPLETE: CPT

## 2019-06-10 PROCEDURE — 99205 OFFICE O/P NEW HI 60 MIN: CPT | Mod: 25

## 2019-06-10 RX ORDER — PHENELZINE SULFATE 15 MG/1
15 TABLET, FILM COATED ORAL
Refills: 0 | Status: ACTIVE | COMMUNITY

## 2019-06-10 RX ORDER — LAMOTRIGINE 200 MG/1
200 TABLET ORAL DAILY
Qty: 90 | Refills: 0 | Status: ACTIVE | COMMUNITY

## 2019-06-10 NOTE — DATA REVIEWED
[FreeTextEntry1] : P.o. CT INR is 3.0. He has been taking 7.5, 3 days a week and 5 mg on the other 4. He will continue the same dose and recheck his level in one month.\par \par Electrocardiogram reveals regular ventricular pacing.   Spirometry reveals moderate restriction. Pulse oximetry is 98%.

## 2019-06-10 NOTE — REVIEW OF SYSTEMS
[Vision Problems] : vision problems [Palpitations] : palpitations [Dyspnea on Exertion] : dyspnea on exertion [Hesitancy] : hesitancy [Nocturia] : nocturia [Negative] : Neurological [Chills] : no chills [Hot Flashes] : no hot flashes [Fatigue] : no fatigue [Discharge] : no discharge [Pain] : no pain [Redness] : no redness [Dryness] : no dryness [Chest Pain] : no chest pain [Itching] : no itching [Lower Ext Edema] : no lower extremity edema [Claudication] : no  leg claudication [Shortness Of Breath] : no shortness of breath [Paroysmal Nocturnal Dyspnea] : no paroysmal nocturnal dyspnea [Orthopena] : no orthopnea [Cough] : no cough [Wheezing] : no wheezing [Dysuria] : no dysuria [Incontinence] : no incontinence [Hematuria] : no hematuria [Frequency] : no frequency [Poor Libido] : libido not poor [FreeTextEntry5] : See present illness [FreeTextEntry8] : one to 2 times [de-identified] : There is a resolving hematoma in the right lower quadrant of the abdomen, postoperative after hernia surgery there is a small area of drainage.

## 2019-06-10 NOTE — HEALTH RISK ASSESSMENT
[Very Good] : ~his/her~  mood as very good [No falls in past year] : Patient reported no falls in the past year [0] : 2) Feeling down, depressed, or hopeless: Not at all (0) [HIV Test offered] : HIV Test offered [Patient declined colonoscopy] : Patient declined colonoscopy [Hepatitis C test offered] : Hepatitis C test offered [None] : None [With Family] : lives with family [On disability] : on disability [Retired] : retired [Feels Safe at Home] : Feels safe at home [Fully functional (bathing, dressing, toileting, transferring, walking, feeding)] : Fully functional (bathing, dressing, toileting, transferring, walking, feeding) [Reports changes in vision] : Reports changes in vision [Reports normal functional visual acuity (ie: able to read med bottle)] : Reports normal functional visual acuity [Smoke Detector] : smoke detector [Carbon Monoxide Detector] : carbon monoxide detector [Seat Belt] :  uses seat belt [Sunscreen] : uses sunscreen [FreeTextEntry1] : General vascular concerns [] : No [de-identified] : Cardiology. Electrophysiology once a year. [de-identified] : Uses chewing tobacco on a daily basis [de-identified] : Sedentary. t [de-identified] : Generally Healthy  [URM7Ectqc] : 0 [Language] : denies difficulty with language [Change in mental status noted] : No change in mental status noted [Behavior] : denies difficulty with behavior [Learning/Retaining New Information] : denies difficulty learning/retaining new information [Handling Complex Tasks] : denies difficulty handling complex tasks [Spatial Ability and Orientation] : denies difficulty with spatial ability and orientation [High Risk Behavior] : no high risk behavior [Sexually Active] : not sexually active [Reports changes in hearing] : Reports no changes in hearing [Reports changes in dental health] : Reports no changes in dental health [Travel to Developing Areas] : does not  travel to developing areas [Guns at Home] : no guns at home [Caregiver Concerns] : does not have caregiver concerns [TB Exposure] : is not being exposed to tuberculosis [ColonoscopyDate] : 2000 [HIVComments] : In progress [HepatitisCComments] : In progress [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 [de-identified] : Glaucoma

## 2019-06-10 NOTE — ASSESSMENT
[FreeTextEntry1] : INR today is 3.0. The patient's desirable range according to his vascular surgeon is 2.5-3.5..\par \par He appears medically stable. Same Rx for now.\par \par Next visit fasting blood work. Electrocardiogram if not done in the past 6-8 weeks. Spirometry.                          Colonoscopy will be discussed

## 2019-06-10 NOTE — HISTORY OF PRESENT ILLNESS
[de-identified] : The patient has a long and complicated vascular history. In 1990 had an aortic valve replacement. In 2003 he had correction of a coarctation of the descending aorta. In 2006 he had a pacemaker. He subsequently had a second aortic valve replacement because of a failure one leaflet and CABG was performed at the same time. Her pacemaker was installed in 2006. A procedure to ameliorate aortic fibrillation did not 68. He has been medically stable in these regards. He has been anticoagulated with Coumadin 7.5 mg 4 days a week and 5 mg the other days.\par \par Pro time obtained today, after one month, was 3.0. He will continue his same dose. [FreeTextEntry1] : New, complete annual examination

## 2019-06-11 LAB
INR PPP: 3 RATIO
POCT-PROTHROMBIN TIME: 35.5 SECS
QUALITY CONTROL: YES

## 2019-06-13 ENCOUNTER — APPOINTMENT (OUTPATIENT)
Dept: SURGERY | Facility: CLINIC | Age: 64
End: 2019-06-13
Payer: MEDICARE

## 2019-06-13 VITALS
HEART RATE: 89 BPM | SYSTOLIC BLOOD PRESSURE: 121 MMHG | TEMPERATURE: 98.2 F | DIASTOLIC BLOOD PRESSURE: 68 MMHG | OXYGEN SATURATION: 96 % | HEIGHT: 66 IN | RESPIRATION RATE: 18 BRPM | BODY MASS INDEX: 25.23 KG/M2 | WEIGHT: 157 LBS

## 2019-06-13 PROCEDURE — 99024 POSTOP FOLLOW-UP VISIT: CPT

## 2019-06-13 NOTE — PHYSICAL EXAM
[de-identified] : The soft tissue swelling about the wound has diminished greatly. There is a small opening in the wound which is draining small amounts of serosanguineous fluid. There is no indication for a recurrence.

## 2019-06-13 NOTE — HISTORY OF PRESENT ILLNESS
[de-identified] : This patient states that his pain is improving and that there is much less swelling and drainage from the wound.

## 2019-07-02 PROBLEM — Z87.19 HISTORY OF INGUINAL HERNIA: Status: RESOLVED | Noted: 2019-03-14 | Resolved: 2019-07-02

## 2019-07-03 ENCOUNTER — APPOINTMENT (OUTPATIENT)
Dept: SURGERY | Facility: CLINIC | Age: 64
End: 2019-07-03
Payer: MEDICARE

## 2019-07-03 DIAGNOSIS — Z87.19 PERSONAL HISTORY OF OTHER DISEASES OF THE DIGESTIVE SYSTEM: ICD-10-CM

## 2019-07-11 ENCOUNTER — APPOINTMENT (OUTPATIENT)
Dept: SURGERY | Facility: CLINIC | Age: 64
End: 2019-07-11
Payer: MEDICARE

## 2019-07-11 VITALS
SYSTOLIC BLOOD PRESSURE: 126 MMHG | DIASTOLIC BLOOD PRESSURE: 71 MMHG | OXYGEN SATURATION: 98 % | HEART RATE: 69 BPM | TEMPERATURE: 97.6 F | RESPIRATION RATE: 15 BRPM

## 2019-07-11 PROCEDURE — 99024 POSTOP FOLLOW-UP VISIT: CPT

## 2019-07-11 NOTE — PHYSICAL EXAM
[de-identified] : Soft and nontender. The wound is not clean dry and completely healed. The soft tissue swelling about the wound has almost completely resolved

## 2019-08-05 LAB
INR PPP: 2.1
PT BLD: 21.9

## 2019-08-06 PROBLEM — Z09 POSTOPERATIVE EXAMINATION: Status: ACTIVE | Noted: 2019-07-10

## 2019-08-07 ENCOUNTER — TRANSCRIPTION ENCOUNTER (OUTPATIENT)
Age: 64
End: 2019-08-07

## 2019-08-07 ENCOUNTER — APPOINTMENT (OUTPATIENT)
Dept: SURGERY | Facility: CLINIC | Age: 64
End: 2019-08-07
Payer: MEDICARE

## 2019-08-07 VITALS
RESPIRATION RATE: 15 BRPM | SYSTOLIC BLOOD PRESSURE: 149 MMHG | WEIGHT: 155 LBS | BODY MASS INDEX: 24.91 KG/M2 | TEMPERATURE: 98.2 F | DIASTOLIC BLOOD PRESSURE: 88 MMHG | HEART RATE: 69 BPM | HEIGHT: 66 IN | OXYGEN SATURATION: 97 %

## 2019-08-07 DIAGNOSIS — Z09 ENCOUNTER FOR FOLLOW-UP EXAMINATION AFTER COMPLETED TREATMENT FOR CONDITIONS OTHER THAN MALIGNANT NEOPLASM: ICD-10-CM

## 2019-08-07 PROCEDURE — 99024 POSTOP FOLLOW-UP VISIT: CPT

## 2019-08-07 NOTE — PHYSICAL EXAM
[de-identified] : Soft and nontender. Wounds clean dry and healing well. There is no lower and the soft tissue swelling induration beneath the wound is consistent with the above history.

## 2019-08-07 NOTE — HISTORY OF PRESENT ILLNESS
[de-identified] : The patient's hernia repair was complicated by a postoperative hematoma. Today he complains of induration under the wound. He is otherwise tolerating regular diet experiencing no pain or

## 2019-08-07 NOTE — ASSESSMENT
[FreeTextEntry1] : This patient has been reassured, once again, that the induration will be resolving in the upcoming months I will see him again on an as-needed basis

## 2019-08-26 LAB
INR PPP: 2
PT BLD: 20.8

## 2019-09-06 LAB
INR PPP: 2.7 RATIO
POCT-PROTHROMBIN TIME: 28 SECS
PT BLD: 2

## 2019-09-12 ENCOUNTER — APPOINTMENT (OUTPATIENT)
Dept: INTERNAL MEDICINE | Facility: CLINIC | Age: 64
End: 2019-09-12
Payer: MEDICARE

## 2019-09-12 VITALS
RESPIRATION RATE: 16 BRPM | HEART RATE: 71 BPM | SYSTOLIC BLOOD PRESSURE: 122 MMHG | HEIGHT: 66 IN | TEMPERATURE: 98.4 F | BODY MASS INDEX: 25.07 KG/M2 | OXYGEN SATURATION: 98 % | DIASTOLIC BLOOD PRESSURE: 80 MMHG | WEIGHT: 156 LBS

## 2019-09-12 DIAGNOSIS — F32.9 ANXIETY DISORDER, UNSPECIFIED: ICD-10-CM

## 2019-09-12 DIAGNOSIS — N28.9 DISORDER OF KIDNEY AND URETER, UNSPECIFIED: ICD-10-CM

## 2019-09-12 DIAGNOSIS — F41.9 ANXIETY DISORDER, UNSPECIFIED: ICD-10-CM

## 2019-09-12 PROCEDURE — 94010 BREATHING CAPACITY TEST: CPT

## 2019-09-12 PROCEDURE — 99214 OFFICE O/P EST MOD 30 MIN: CPT | Mod: 25

## 2019-09-12 RX ORDER — SULFAMETHOXAZOLE AND TRIMETHOPRIM 400; 80 MG/1; MG/1
400-80 TABLET ORAL
Qty: 6 | Refills: 0 | Status: DISCONTINUED | COMMUNITY
Start: 2019-05-17 | End: 2019-09-12

## 2019-09-12 NOTE — HEALTH RISK ASSESSMENT
[] : Yes [No falls in past year] : Patient reported no falls in the past year [No] : No [0] : 1) Little interest or pleasure doing things: Not at all (0) [de-identified] : None [de-identified] : chews tobacco [de-identified] : Walking [de-identified] : Healthy [HMI7Qnqky] : 0

## 2019-09-12 NOTE — ASSESSMENT
[FreeTextEntry1] : He is very anxious about the lower abdominal discomfort status post hernia repair with mesh. A CT can be considered if the symptom persists he is reassured for now.\par \par Blood work before next visit her and

## 2019-09-12 NOTE — REVIEW OF SYSTEMS
[Vision Problems] : vision problems [Palpitations] : palpitations [Dyspnea on Exertion] : dyspnea on exertion [Hesitancy] : hesitancy [Nocturia] : nocturia [Negative] : Neurological [Chills] : no chills [Fatigue] : no fatigue [Hot Flashes] : no hot flashes [Discharge] : no discharge [Pain] : no pain [Redness] : no redness [Dryness] : no dryness [Itching] : no itching [Chest Pain] : no chest pain [Claudication] : no  leg claudication [Lower Ext Edema] : no lower extremity edema [Orthopena] : no orthopnea [Paroysmal Nocturnal Dyspnea] : no paroysmal nocturnal dyspnea [Shortness Of Breath] : no shortness of breath [Wheezing] : no wheezing [Cough] : no cough [Dysuria] : no dysuria [Incontinence] : no incontinence [Hematuria] : no hematuria [Frequency] : no frequency [Poor Libido] : libido not poor [FreeTextEntry5] : See present illness [FreeTextEntry8] : one to 2 times [de-identified] : There is a resolving hematoma in the right lower quadrant of the abdomen, postoperative after hernia surgery there is a small area of drainage.

## 2019-09-26 LAB
INR PPP: 2.7 RATIO
POCT-PROTHROMBIN TIME: 28 SECS

## 2019-10-02 PROBLEM — Z60.2 PERSON LIVING ALONE: Status: ACTIVE | Noted: 2018-08-28

## 2019-10-18 LAB
INR PPP: 3.4
PT BLD: 31.9

## 2019-12-03 ENCOUNTER — APPOINTMENT (OUTPATIENT)
Dept: INTERNAL MEDICINE | Facility: CLINIC | Age: 64
End: 2019-12-03
Payer: MEDICARE

## 2019-12-03 PROCEDURE — 36415 COLL VENOUS BLD VENIPUNCTURE: CPT

## 2019-12-10 ENCOUNTER — APPOINTMENT (OUTPATIENT)
Dept: INTERNAL MEDICINE | Facility: CLINIC | Age: 64
End: 2019-12-10
Payer: MEDICARE

## 2019-12-10 VITALS
BODY MASS INDEX: 26.03 KG/M2 | TEMPERATURE: 97.8 F | SYSTOLIC BLOOD PRESSURE: 140 MMHG | HEART RATE: 66 BPM | HEIGHT: 66 IN | RESPIRATION RATE: 16 BRPM | OXYGEN SATURATION: 98 % | WEIGHT: 162 LBS | DIASTOLIC BLOOD PRESSURE: 76 MMHG

## 2019-12-10 DIAGNOSIS — E78.49 OTHER HYPERLIPIDEMIA: ICD-10-CM

## 2019-12-10 DIAGNOSIS — R15.0 INCOMPLETE DEFECATION: ICD-10-CM

## 2019-12-10 PROCEDURE — 99214 OFFICE O/P EST MOD 30 MIN: CPT | Mod: 25

## 2019-12-10 PROCEDURE — 94010 BREATHING CAPACITY TEST: CPT

## 2019-12-10 NOTE — ASSESSMENT
[FreeTextEntry1] : Cardiovascular status appears stable. Blood pressure is slightly higher than desirable.  Propanolol dosage will be increased, but the patient is unsure of the dosage that he is taking. He will call.\par \par He has a surgical appointment 12/12 to reevaluate his right inguinal repair He is quite anxious about it although it is likely okay.\par He reports incomplete bowel movements.  A colonoscopy is recommended. he is warned.\par \par \par His last blood work revealed a markedly elevated TSH, over 8. A prior TSH levelseveral months ago was normal which raises the issue whether or not this result is a laboratory misadventure.Blood work will be rechecked prior to next visit\par \par .

## 2019-12-10 NOTE — HISTORY OF PRESENT ILLNESS
[FreeTextEntry1] : Followup for hypertension, atrial fibrillation, abdominal incisional hernia,pacemaker,

## 2019-12-12 ENCOUNTER — APPOINTMENT (OUTPATIENT)
Dept: SURGERY | Facility: CLINIC | Age: 64
End: 2019-12-12
Payer: MEDICARE

## 2019-12-12 VITALS
OXYGEN SATURATION: 100 % | DIASTOLIC BLOOD PRESSURE: 81 MMHG | RESPIRATION RATE: 16 BRPM | HEART RATE: 70 BPM | SYSTOLIC BLOOD PRESSURE: 156 MMHG | TEMPERATURE: 97.5 F

## 2019-12-12 DIAGNOSIS — R10.31 RIGHT LOWER QUADRANT PAIN: ICD-10-CM

## 2019-12-12 PROCEDURE — 99213 OFFICE O/P EST LOW 20 MIN: CPT

## 2019-12-12 RX ORDER — WARFARIN SODIUM 6 MG/1
TABLET ORAL
Refills: 0 | Status: DISCONTINUED | COMMUNITY
End: 2019-12-12

## 2019-12-12 NOTE — ASSESSMENT
[FreeTextEntry1] : I offered this patient a referral for a pain management specialist and also to Dr. Molina, of the neurosurgery Department who has an interest in ilioinguinal neuralgia.  Both referrals were declined\par \par I have ordered a sonogram of the area to rule out a hernia recurrence

## 2019-12-12 NOTE — PHYSICAL EXAM
[de-identified] : Soft and nontender. The wound is clean dry and well healed. There is no evidence for recurrence. There is mild tenderness at the middle of the incision

## 2019-12-12 NOTE — HISTORY OF PRESENT ILLNESS
[de-identified] : This patient underwent an inguinal hernia repair in May of this year.  His postoperative course was complicated by postoperative hematoma secondary to his anticoagulation. The hematoma resorb over the ensuing weeks however the patient continues to complain.  his pain is described as moderate and almost on a daily basis His pain is described as dull and is maximal at the middle of the surgical incision

## 2019-12-12 NOTE — HISTORY OF PRESENT ILLNESS
[de-identified] : This patient underwent an inguinal hernia repair in May of this year.  His postoperative course was complicated by postoperative hematoma secondary to his anticoagulation. The hematoma resorb over the ensuing weeks however the patient continues to complain.  his pain is described as moderate and almost on a daily basis His pain is described as dull and is maximal at the middle of the surgical incision

## 2019-12-12 NOTE — PHYSICAL EXAM
[de-identified] : Soft and nontender. The wound is clean dry and well healed. There is no evidence for recurrence. There is mild tenderness at the middle of the incision

## 2019-12-13 ENCOUNTER — MEDICATION RENEWAL (OUTPATIENT)
Age: 64
End: 2019-12-13

## 2019-12-13 RX ORDER — METOPROLOL SUCCINATE 50 MG/1
50 TABLET, EXTENDED RELEASE ORAL DAILY
Qty: 90 | Refills: 3 | Status: ACTIVE | COMMUNITY
Start: 2019-12-13

## 2019-12-13 RX ORDER — METOPROLOL TARTRATE 100 MG/1
100 TABLET, FILM COATED ORAL
Refills: 0 | Status: DISCONTINUED | COMMUNITY
End: 2019-12-13

## 2019-12-17 ENCOUNTER — FORM ENCOUNTER (OUTPATIENT)
Age: 64
End: 2019-12-17

## 2019-12-18 ENCOUNTER — APPOINTMENT (OUTPATIENT)
Dept: ULTRASOUND IMAGING | Facility: CLINIC | Age: 64
End: 2019-12-18
Payer: MEDICARE

## 2019-12-18 ENCOUNTER — OUTPATIENT (OUTPATIENT)
Dept: OUTPATIENT SERVICES | Facility: HOSPITAL | Age: 64
LOS: 1 days | End: 2019-12-18
Payer: MEDICARE

## 2019-12-18 DIAGNOSIS — Z95.4 PRESENCE OF OTHER HEART-VALVE REPLACEMENT: Chronic | ICD-10-CM

## 2019-12-18 DIAGNOSIS — Z98.890 OTHER SPECIFIED POSTPROCEDURAL STATES: Chronic | ICD-10-CM

## 2019-12-18 DIAGNOSIS — R10.31 RIGHT LOWER QUADRANT PAIN: ICD-10-CM

## 2019-12-18 PROCEDURE — 76705 ECHO EXAM OF ABDOMEN: CPT

## 2019-12-18 PROCEDURE — 76705 ECHO EXAM OF ABDOMEN: CPT | Mod: 26

## 2019-12-27 LAB
ALBUMIN SERPL ELPH-MCNC: 4.3 G/DL
ALP BLD-CCNC: 79 U/L
ALT SERPL-CCNC: 26 U/L
ANION GAP SERPL CALC-SCNC: 11 MMOL/L
AST SERPL-CCNC: 28 U/L
BASOPHILS # BLD AUTO: 0.09 K/UL
BASOPHILS NFR BLD AUTO: 1.4 %
BILIRUB SERPL-MCNC: 0.4 MG/DL
BUN SERPL-MCNC: 19 MG/DL
CALCIUM SERPL-MCNC: 9.3 MG/DL
CHLORIDE SERPL-SCNC: 103 MMOL/L
CHOLEST SERPL-MCNC: 147 MG/DL
CHOLEST/HDLC SERPL: 3.2 RATIO
CO2 SERPL-SCNC: 26 MMOL/L
CREAT SERPL-MCNC: 1.5 MG/DL
EOSINOPHIL # BLD AUTO: 0.78 K/UL
EOSINOPHIL NFR BLD AUTO: 12.1 %
GLUCOSE SERPL-MCNC: 104 MG/DL
HCT VFR BLD CALC: 44.7 %
HDLC SERPL-MCNC: 46 MG/DL
HGB BLD-MCNC: 13.5 G/DL
IMM GRANULOCYTES NFR BLD AUTO: 0.2 %
INR PPP: 2.94 RATIO
LDLC SERPL CALC-MCNC: 80 MG/DL
LYMPHOCYTES # BLD AUTO: 1.27 K/UL
LYMPHOCYTES NFR BLD AUTO: 19.6 %
MAN DIFF?: NORMAL
MCHC RBC-ENTMCNC: 28.9 PG
MCHC RBC-ENTMCNC: 30.2 GM/DL
MCV RBC AUTO: 95.7 FL
MONOCYTES # BLD AUTO: 0.61 K/UL
MONOCYTES NFR BLD AUTO: 9.4 %
NEUTROPHILS # BLD AUTO: 3.71 K/UL
NEUTROPHILS NFR BLD AUTO: 57.3 %
PLATELET # BLD AUTO: 272 K/UL
POTASSIUM SERPL-SCNC: 5.1 MMOL/L
PROT SERPL-MCNC: 7.2 G/DL
PT BLD: 34.4 SEC
RBC # BLD: 4.67 M/UL
RBC # FLD: 14.4 %
SODIUM SERPL-SCNC: 140 MMOL/L
T3 SERPL-MCNC: 116 NG/DL
T4 SERPL-MCNC: 6 UG/DL
TRIGL SERPL-MCNC: 103 MG/DL
TSH SERPL-ACNC: 8.35 UIU/ML
WBC # FLD AUTO: 6.47 K/UL

## 2020-01-27 LAB
INR PPP: 4.2
PROTHROMBIN TIME COMMENT: NORMAL
PT BLD: 39.7

## 2020-01-31 DIAGNOSIS — Z87.09 PERSONAL HISTORY OF OTHER DISEASES OF THE RESPIRATORY SYSTEM: ICD-10-CM

## 2020-01-31 RX ORDER — OSELTAMIVIR PHOSPHATE 75 MG/1
75 CAPSULE ORAL TWICE DAILY
Qty: 10 | Refills: 0 | Status: ACTIVE | COMMUNITY
Start: 2020-01-31 | End: 1900-01-01

## 2020-03-05 ENCOUNTER — LABORATORY RESULT (OUTPATIENT)
Age: 65
End: 2020-03-05

## 2020-03-05 ENCOUNTER — APPOINTMENT (OUTPATIENT)
Dept: INTERNAL MEDICINE | Facility: CLINIC | Age: 65
End: 2020-03-05
Payer: MEDICARE

## 2020-03-05 VITALS
WEIGHT: 159 LBS | DIASTOLIC BLOOD PRESSURE: 80 MMHG | BODY MASS INDEX: 25.55 KG/M2 | SYSTOLIC BLOOD PRESSURE: 150 MMHG | HEIGHT: 66 IN | OXYGEN SATURATION: 96 % | TEMPERATURE: 98.9 F | HEART RATE: 70 BPM

## 2020-03-05 DIAGNOSIS — Z86.69 PERSONAL HISTORY OF OTHER DISEASES OF THE NERVOUS SYSTEM AND SENSE ORGANS: ICD-10-CM

## 2020-03-05 PROCEDURE — 36415 COLL VENOUS BLD VENIPUNCTURE: CPT

## 2020-03-05 PROCEDURE — 99214 OFFICE O/P EST MOD 30 MIN: CPT

## 2020-03-12 ENCOUNTER — APPOINTMENT (OUTPATIENT)
Dept: INTERNAL MEDICINE | Facility: CLINIC | Age: 65
End: 2020-03-12
Payer: MEDICARE

## 2020-03-12 VITALS
BODY MASS INDEX: 24.75 KG/M2 | SYSTOLIC BLOOD PRESSURE: 148 MMHG | HEIGHT: 66 IN | WEIGHT: 154 LBS | HEART RATE: 71 BPM | OXYGEN SATURATION: 98 % | DIASTOLIC BLOOD PRESSURE: 90 MMHG | TEMPERATURE: 97.8 F

## 2020-03-12 DIAGNOSIS — J20.9 ACUTE BRONCHITIS, UNSPECIFIED: ICD-10-CM

## 2020-03-12 PROCEDURE — 99214 OFFICE O/P EST MOD 30 MIN: CPT | Mod: 25

## 2020-03-12 PROCEDURE — 71046 X-RAY EXAM CHEST 2 VIEWS: CPT

## 2020-03-12 NOTE — ASSESSMENT
[FreeTextEntry1] : Cardiovascular status appears stable. Hypertension appears to be marginally controlled. His old cough appears to give an accurate diastolic reading-140/110.                                                                                                                 The patient has an upper respiratory infection with cough. Cough suppressants are problematic because he is taking an MAO inhibitor.\par \par Chest x-ray reveals no acute process. His present respiratory infection appears viral. He is warned That if he develops fever or worsening shortness of breath he should be reevaluated.\par \par Note that the patient refuses an electrocardiogram on the basis of the fact that it is "worthless", and refuses spirometry on the basis of the fact that it will cost him $22..\par \par CPE in 6 months.

## 2020-03-12 NOTE — REVIEW OF SYSTEMS
[Postnasal Drip] : postnasal drip [Nasal Discharge] : nasal discharge [Dyspnea on Exertion] : dyspnea on exertion [Negative] : Gastrointestinal [FreeTextEntry6] : Somewhat chronic, but worse in recent days

## 2020-03-12 NOTE — HEALTH RISK ASSESSMENT
[] : Yes [16-20] : 16-20 [No] : In the past 12 months have you used drugs other than those required for medical reasons? No [No falls in past year] : Patient reported no falls in the past year [0] : 2) Feeling down, depressed, or hopeless: Not at all (0) [de-identified] : Regular pacemaker monitoring by telephone, he is looking for a new cardiologist. [de-identified] : Chewing tobacco [de-identified] : Sedentary [de-identified] : Healthy [[id number]] : 0

## 2020-03-12 NOTE — HISTORY OF PRESENT ILLNESS
[FreeTextEntry1] : Followup of hypertension, Aortic valve disease,CardiacPacemaker, Atrial fibrillation. Also recent onset of URI [de-identified] : In the past 3 days he is developed an upper respiratory infection with rhinitis and an occasional nonproductive cough. No fever chills or sweats.\par \par He checks his blood pressure at home but is uncertain about the accuracy of the machine.

## 2020-03-12 NOTE — PHYSICAL EXAM
[Well Nourished] : well nourished [Well Developed] : well developed [No Edema] : there was no peripheral edema [Normal Supraclavicular Nodes] : no supraclavicular lymphadenopathy [Normal Posterior Cervical Nodes] : no posterior cervical lymphadenopathy [Normal Anterior Cervical Nodes] : no anterior cervical lymphadenopathy [Normal] : affect was normal and insight and judgment were intact [de-identified] : Moderate expiratory congestion with cough, no rhonchi no wheezes. [de-identified] : Rate seems regular

## 2020-03-12 NOTE — DATA REVIEWED
[FreeTextEntry1] : Chest x-ray reveals the presence of a pacemaker,  sternal wires, and a prosthetic  aortic valve

## 2020-06-08 ENCOUNTER — APPOINTMENT (OUTPATIENT)
Dept: INTERNAL MEDICINE | Facility: CLINIC | Age: 65
End: 2020-06-08
Payer: MEDICARE

## 2020-06-08 LAB
INR PPP: 4.5
PROTHROMBIN TIME COMMENT: NORMAL
PT BLD: 41.8

## 2020-06-08 PROCEDURE — 99442: CPT

## 2020-06-18 ENCOUNTER — APPOINTMENT (OUTPATIENT)
Dept: INTERNAL MEDICINE | Facility: CLINIC | Age: 65
End: 2020-06-18
Payer: MEDICARE

## 2020-06-18 LAB
INR PPP: 2.2
PROTHROMBIN TIME COMMENT: NORMAL
PT BLD: 21.1

## 2020-06-18 PROCEDURE — 99441: CPT

## 2020-07-02 ENCOUNTER — APPOINTMENT (OUTPATIENT)
Dept: INTERNAL MEDICINE | Facility: CLINIC | Age: 65
End: 2020-07-02
Payer: MEDICARE

## 2020-07-02 LAB
INR PPP: 4.5
PROTHROMBIN TIME COMMENT: NORMAL
PT BLD: 42.1

## 2020-07-02 PROCEDURE — 99441: CPT

## 2020-07-10 ENCOUNTER — APPOINTMENT (OUTPATIENT)
Dept: INTERNAL MEDICINE | Facility: CLINIC | Age: 65
End: 2020-07-10
Payer: MEDICARE

## 2020-07-10 LAB
INR PPP: 2.4
PROTHROMBIN TIME COMMENT: NORMAL
PT BLD: 23.1

## 2020-07-10 PROCEDURE — 99441: CPT

## 2020-07-24 ENCOUNTER — APPOINTMENT (OUTPATIENT)
Dept: INTERNAL MEDICINE | Facility: CLINIC | Age: 65
End: 2020-07-24
Payer: MEDICARE

## 2020-07-24 LAB
INR PPP: 3.8
PROTHROMBIN TIME COMMENT: NORMAL
PT BLD: 36.1

## 2020-07-24 PROCEDURE — 99441: CPT

## 2020-08-27 ENCOUNTER — APPOINTMENT (OUTPATIENT)
Dept: INTERNAL MEDICINE | Facility: CLINIC | Age: 65
End: 2020-08-27
Payer: MEDICARE

## 2020-08-27 LAB
INR PPP: 3
PROTHROMBIN TIME COMMENT: NORMAL
PT BLD: 29.2

## 2020-08-27 PROCEDURE — 99441: CPT

## 2020-08-27 NOTE — ED ADULT NURSE NOTE - PRIMARY CARE PROVIDER
Last Clinic Visit: 7/21/2020  [facility] ( Dr. Collier)  NCV: 8-27-20 ( Dr. Rodriguez)       unknown

## 2020-09-04 ENCOUNTER — RX RENEWAL (OUTPATIENT)
Age: 65
End: 2020-09-04

## 2020-09-08 ENCOUNTER — RX RENEWAL (OUTPATIENT)
Age: 65
End: 2020-09-08

## 2020-09-08 RX ORDER — WARFARIN 5 MG/1
5 TABLET ORAL
Qty: 90 | Refills: 3 | Status: ACTIVE | COMMUNITY
Start: 2019-12-10 | End: 1900-01-01

## 2020-09-10 ENCOUNTER — APPOINTMENT (OUTPATIENT)
Dept: INTERNAL MEDICINE | Facility: CLINIC | Age: 65
End: 2020-09-10

## 2020-10-10 ENCOUNTER — INPATIENT (INPATIENT)
Facility: HOSPITAL | Age: 65
LOS: 1 days | Discharge: HOME | End: 2020-10-12
Attending: SURGERY | Admitting: SURGERY
Payer: MEDICARE

## 2020-10-10 VITALS
RESPIRATION RATE: 16 BRPM | HEART RATE: 76 BPM | DIASTOLIC BLOOD PRESSURE: 87 MMHG | SYSTOLIC BLOOD PRESSURE: 145 MMHG | WEIGHT: 156.97 LBS | OXYGEN SATURATION: 100 % | TEMPERATURE: 98 F | HEIGHT: 67 IN

## 2020-10-10 DIAGNOSIS — K56.609 UNSPECIFIED INTESTINAL OBSTRUCTION, UNSPECIFIED AS TO PARTIAL VERSUS COMPLETE OBSTRUCTION: ICD-10-CM

## 2020-10-10 DIAGNOSIS — Z98.890 OTHER SPECIFIED POSTPROCEDURAL STATES: Chronic | ICD-10-CM

## 2020-10-10 DIAGNOSIS — Z95.4 PRESENCE OF OTHER HEART-VALVE REPLACEMENT: Chronic | ICD-10-CM

## 2020-10-10 LAB
ALBUMIN SERPL ELPH-MCNC: 5.1 G/DL — HIGH (ref 3.3–5)
ALP SERPL-CCNC: 93 U/L — SIGNIFICANT CHANGE UP (ref 40–120)
ALT FLD-CCNC: 20 U/L — SIGNIFICANT CHANGE UP (ref 4–41)
ANION GAP SERPL CALC-SCNC: 10 MMO/L — SIGNIFICANT CHANGE UP (ref 7–14)
APTT BLD: 45.1 SEC — HIGH (ref 27–36.3)
AST SERPL-CCNC: 25 U/L — SIGNIFICANT CHANGE UP (ref 4–40)
BASE EXCESS BLDV CALC-SCNC: 3.9 MMOL/L — SIGNIFICANT CHANGE UP
BASOPHILS # BLD AUTO: 0.05 K/UL — SIGNIFICANT CHANGE UP (ref 0–0.2)
BASOPHILS NFR BLD AUTO: 0.4 % — SIGNIFICANT CHANGE UP (ref 0–2)
BILIRUB SERPL-MCNC: 0.6 MG/DL — SIGNIFICANT CHANGE UP (ref 0.2–1.2)
BLD GP AB SCN SERPL QL: NEGATIVE — SIGNIFICANT CHANGE UP
BLOOD GAS VENOUS - CREATININE: 1.53 MG/DL — HIGH (ref 0.5–1.3)
BLOOD GAS VENOUS - FIO2: 21 — SIGNIFICANT CHANGE UP
BUN SERPL-MCNC: 14 MG/DL — SIGNIFICANT CHANGE UP (ref 7–23)
CALCIUM SERPL-MCNC: 10 MG/DL — SIGNIFICANT CHANGE UP (ref 8.4–10.5)
CHLORIDE BLDV-SCNC: 101 MMOL/L — SIGNIFICANT CHANGE UP (ref 96–108)
CHLORIDE SERPL-SCNC: 101 MMOL/L — SIGNIFICANT CHANGE UP (ref 98–107)
CO2 SERPL-SCNC: 27 MMOL/L — SIGNIFICANT CHANGE UP (ref 22–31)
CREAT SERPL-MCNC: 1.41 MG/DL — HIGH (ref 0.5–1.3)
EOSINOPHIL # BLD AUTO: 0.5 K/UL — SIGNIFICANT CHANGE UP (ref 0–0.5)
EOSINOPHIL NFR BLD AUTO: 3.8 % — SIGNIFICANT CHANGE UP (ref 0–6)
GAS PNL BLDV: 136 MMOL/L — SIGNIFICANT CHANGE UP (ref 136–146)
GLUCOSE BLDV-MCNC: 121 MG/DL — HIGH (ref 70–99)
GLUCOSE SERPL-MCNC: 120 MG/DL — HIGH (ref 70–99)
HCO3 BLDV-SCNC: 26 MMOL/L — SIGNIFICANT CHANGE UP (ref 20–27)
HCT VFR BLD CALC: 46.1 % — SIGNIFICANT CHANGE UP (ref 39–50)
HCT VFR BLDV CALC: 47.1 % — SIGNIFICANT CHANGE UP (ref 39–51)
HGB BLD-MCNC: 14.2 G/DL — SIGNIFICANT CHANGE UP (ref 13–17)
HGB BLDV-MCNC: 15.4 G/DL — SIGNIFICANT CHANGE UP (ref 13–17)
IMM GRANULOCYTES NFR BLD AUTO: 0.3 % — SIGNIFICANT CHANGE UP (ref 0–1.5)
INR BLD: 1.96 — HIGH (ref 0.88–1.16)
LACTATE BLDV-MCNC: 1.2 MMOL/L — SIGNIFICANT CHANGE UP (ref 0.5–2)
LIDOCAIN IGE QN: 81 U/L — HIGH (ref 7–60)
LYMPHOCYTES # BLD AUTO: 1 K/UL — SIGNIFICANT CHANGE UP (ref 1–3.3)
LYMPHOCYTES # BLD AUTO: 7.5 % — LOW (ref 13–44)
MCHC RBC-ENTMCNC: 28.1 PG — SIGNIFICANT CHANGE UP (ref 27–34)
MCHC RBC-ENTMCNC: 30.8 % — LOW (ref 32–36)
MCV RBC AUTO: 91.1 FL — SIGNIFICANT CHANGE UP (ref 80–100)
MONOCYTES # BLD AUTO: 1 K/UL — HIGH (ref 0–0.9)
MONOCYTES NFR BLD AUTO: 7.5 % — SIGNIFICANT CHANGE UP (ref 2–14)
NEUTROPHILS # BLD AUTO: 10.69 K/UL — HIGH (ref 1.8–7.4)
NEUTROPHILS NFR BLD AUTO: 80.5 % — HIGH (ref 43–77)
NRBC # FLD: 0 K/UL — SIGNIFICANT CHANGE UP (ref 0–0)
PCO2 BLDV: 57 MMHG — HIGH (ref 41–51)
PH BLDV: 7.33 PH — SIGNIFICANT CHANGE UP (ref 7.32–7.43)
PLATELET # BLD AUTO: 261 K/UL — SIGNIFICANT CHANGE UP (ref 150–400)
PMV BLD: 10.2 FL — SIGNIFICANT CHANGE UP (ref 7–13)
PO2 BLDV: 37 MMHG — SIGNIFICANT CHANGE UP (ref 35–40)
POTASSIUM BLDV-SCNC: 4.3 MMOL/L — SIGNIFICANT CHANGE UP (ref 3.4–4.5)
POTASSIUM SERPL-MCNC: 4.6 MMOL/L — SIGNIFICANT CHANGE UP (ref 3.5–5.3)
POTASSIUM SERPL-SCNC: 4.6 MMOL/L — SIGNIFICANT CHANGE UP (ref 3.5–5.3)
PROT SERPL-MCNC: 8.2 G/DL — SIGNIFICANT CHANGE UP (ref 6–8.3)
PROTHROM AB SERPL-ACNC: 21.8 SEC — HIGH (ref 10.6–13.6)
RBC # BLD: 5.06 M/UL — SIGNIFICANT CHANGE UP (ref 4.2–5.8)
RBC # FLD: 13.2 % — SIGNIFICANT CHANGE UP (ref 10.3–14.5)
RH IG SCN BLD-IMP: POSITIVE — SIGNIFICANT CHANGE UP
SAO2 % BLDV: 62.7 % — SIGNIFICANT CHANGE UP (ref 60–85)
SARS-COV-2 RNA SPEC QL NAA+PROBE: SIGNIFICANT CHANGE UP
SODIUM SERPL-SCNC: 138 MMOL/L — SIGNIFICANT CHANGE UP (ref 135–145)
TROPONIN T, HIGH SENSITIVITY: 36 NG/L — SIGNIFICANT CHANGE UP (ref ?–14)
WBC # BLD: 13.28 K/UL — HIGH (ref 3.8–10.5)
WBC # FLD AUTO: 13.28 K/UL — HIGH (ref 3.8–10.5)

## 2020-10-10 PROCEDURE — 99285 EMERGENCY DEPT VISIT HI MDM: CPT

## 2020-10-10 PROCEDURE — 71045 X-RAY EXAM CHEST 1 VIEW: CPT | Mod: 26

## 2020-10-10 PROCEDURE — 74174 CTA ABD&PLVS W/CONTRAST: CPT | Mod: 26

## 2020-10-10 PROCEDURE — 99222 1ST HOSP IP/OBS MODERATE 55: CPT | Mod: GC

## 2020-10-10 RX ORDER — SODIUM CHLORIDE 9 MG/ML
1000 INJECTION, SOLUTION INTRAVENOUS
Refills: 0 | Status: DISCONTINUED | OUTPATIENT
Start: 2020-10-10 | End: 2020-10-11

## 2020-10-10 RX ORDER — TIMOLOL 0.5 %
1 DROPS OPHTHALMIC (EYE)
Qty: 0 | Refills: 0 | DISCHARGE

## 2020-10-10 RX ORDER — HEPARIN SODIUM 5000 [USP'U]/ML
1300 INJECTION INTRAVENOUS; SUBCUTANEOUS
Qty: 25000 | Refills: 0 | Status: DISCONTINUED | OUTPATIENT
Start: 2020-10-10 | End: 2020-10-11

## 2020-10-10 RX ORDER — WARFARIN SODIUM 2.5 MG/1
7.5 TABLET ORAL ONCE
Refills: 0 | Status: COMPLETED | OUTPATIENT
Start: 2020-10-10 | End: 2020-10-10

## 2020-10-10 RX ORDER — METOPROLOL TARTRATE 50 MG
5 TABLET ORAL EVERY 6 HOURS
Refills: 0 | Status: DISCONTINUED | OUTPATIENT
Start: 2020-10-10 | End: 2020-10-12

## 2020-10-10 RX ORDER — LOSARTAN POTASSIUM 100 MG/1
100 TABLET, FILM COATED ORAL DAILY
Refills: 0 | Status: DISCONTINUED | OUTPATIENT
Start: 2020-10-10 | End: 2020-10-10

## 2020-10-10 RX ORDER — LATANOPROST 0.05 MG/ML
1 SOLUTION/ DROPS OPHTHALMIC; TOPICAL AT BEDTIME
Refills: 0 | Status: DISCONTINUED | OUTPATIENT
Start: 2020-10-10 | End: 2020-10-12

## 2020-10-10 RX ORDER — CLONAZEPAM 1 MG
0.5 TABLET ORAL AT BEDTIME
Refills: 0 | Status: DISCONTINUED | OUTPATIENT
Start: 2020-10-10 | End: 2020-10-12

## 2020-10-10 RX ADMIN — LATANOPROST 1 DROP(S): 0.05 SOLUTION/ DROPS OPHTHALMIC; TOPICAL at 21:21

## 2020-10-10 RX ADMIN — WARFARIN SODIUM 7.5 MILLIGRAM(S): 2.5 TABLET ORAL at 21:20

## 2020-10-10 RX ADMIN — Medication 0.5 MILLIGRAM(S): at 21:59

## 2020-10-10 RX ADMIN — HEPARIN SODIUM 13 UNIT(S)/HR: 5000 INJECTION INTRAVENOUS; SUBCUTANEOUS at 21:21

## 2020-10-10 RX ADMIN — SODIUM CHLORIDE 120 MILLILITER(S): 9 INJECTION, SOLUTION INTRAVENOUS at 19:30

## 2020-10-10 NOTE — ED PROVIDER NOTE - ATTENDING CONTRIBUTION TO CARE
DR. MONTGOMERY, ATTENDING MD-  I performed a face to face bedside interview with the patient regarding history of present illness, review of symptoms and past medical history. I completed an independent physical exam.  I have discussed the patient's plan of care with the resident.   Documentation as above in the note.    64 y/o male h/o aaa, htn, afib on coumadin here with abd pain n/v since last night.  Last bm last night.  Denies f/c, ha, neck stiffness, cp, sob, cough, dysuria, rash.  Afebrile, vs wnl, grimacing, dry mm, ctabil, s1s2 rrr no m/r/g, abd soft distended mild diffuse ttp no r/g, no cva tenderness b/l, no leg swelling b/l, no rash.  Evaluate for sbo, ileus, lyte abn, viral syndrome, atypical acs.  Obtain cbc cmp coags trop cxr ct a/p ekg give ivf antiemetic pain med reassess.

## 2020-10-10 NOTE — ED PROVIDER NOTE - OBJECTIVE STATEMENT
home meds  Losartan, metoprolol, warfarin, Lamictal, Klonopin, statin    NKDA 65M with aFib on warfarin, HTN on metoprolol, lsoartan, AAA, hernia surgery, LAD bypass surgery, bicuspid valve s/p SVR p/w 1 day of acute onset dyspepsia and abdominal pain/back pain. No fevers/chills, made himself vomit to get relief but provided minimal relief. No dizziness, fevers/chills, nausea. No BM or flatus x 2 days. Urinating well, denies chest pain, SOB, cough. Still has gallbladder, appendix. No pain a/w food intake at all    home meds  Losartan, metoprolol, warfarin, Lamictal, Klonopin, statin    NKDA

## 2020-10-10 NOTE — ED PROVIDER NOTE - PMH
AAA (abdominal aortic aneurysm)    Afib    Anxiety    Bicuspid aortic valve    Chronic shortness of breath  with exertion  Depression  taking MAOI  Glaucoma    Hepatitis B  remote history; while in [place]  Long term current use of anticoagulant    Mechanical heart valve present  [facility]  - pt is unsure of maker  Pacemaker  [employer] 1240  implanted 2006 w/ generator change 2014     Right inguinal hernia    Subdural hematoma  remote history

## 2020-10-10 NOTE — ED ADULT TRIAGE NOTE - CHIEF COMPLAINT QUOTE
abd pains since last pm ,nausea, vomiting. states undigested food. denies diarrhea. last bm yesterday. reports back pains. denies problems urinating

## 2020-10-10 NOTE — H&P ADULT - NSHPPHYSICALEXAM_GEN_ALL_CORE
Physical Examination:  GEN: NAD, resting quietly  NEURO: AAOx3, CN II-XII grossly intact, no focal deficits  PULM: symmetric chest rise bilaterally, no increased WOB  ABD: soft, mildly distended, nontender, two soft para-midline ventral hernias easily reducible, no tenderness or erythema. no skin changes  EXTR: no cyanosis or edema, moving all extremities

## 2020-10-10 NOTE — H&P ADULT - ASSESSMENT
Mr. Grey is a 65 year old man with a PMH of afib s/p PPM 2006 (replaced 2014), AAA, Aortic valve replacement in 1990/2003 for which he is on coumadin alternating 5/7.5mg, ex-lap for infection s/p AVR in 2003, anxiety/depression, HTN who presents with adhesive SBO. Patient feels much better now but still has not passed gas/or had a BM yet so will be admitted overnight.    Plan:  - NPO/IVF (LR)  - Pain meds upon exam  - patient subtx on coumadin, cards following  - recommended heparin transition to coumadin (dosing 7.5mg tonight)  - No need for NGT, patient no longer nauseated: patient understands risks  - PRN metoprolol 5mg IV PRN for systolic  - hold PO psych meds  - AM labs    Discussed with Dr. Sage  [id number]

## 2020-10-10 NOTE — ED PROVIDER NOTE - CLINICAL SUMMARY MEDICAL DECISION MAKING FREE TEXT BOX
65M with aFib on warfarin, PPM placed 2006, AAA, s/p SVR or AV, LAD bypass, abdominal surgeries p/w acute onset abdominal pain. No food association. Given history, will obtain CTA abd/pelvis, CBC/CMP INR, lipase, trop, CXR. EKG nonischemic.

## 2020-10-10 NOTE — ED PROVIDER NOTE - PHYSICAL EXAMINATION
PHYSICAL EXAM:  GENERAL: NAD, lying in bed comfortably  HEAD:  Atraumatic, Normocephalic  EYES: EOMI, PERRLA, conjunctiva and sclera clear  ENT: Moist mucous membranes  NECK: Supple, No JVD  CHEST/LUNG: Clear to auscultation bilaterally; No rales, rhonchi, wheezing, or rubs. Unlabored respirations  HEART: Regular rate and rhythm; No murmurs, rubs, or gallops  ABDOMEN: Mild TTP in epigastrium, equivocal Cruz's,   EXTREMITIES:  2+ Peripheral Pulses, brisk capillary refill. No clubbing, cyanosis, or edema  NERVOUS SYSTEM:  Alert & Oriented X3, speech clear. No deficits.  MSK: FROM all 4 extremities, full and equal strength. no back pain to palpation  SKIN: ventral scars from surgeries

## 2020-10-10 NOTE — PATIENT PROFILE ADULT - NSPROMUTINFOINDIVIDFT_GEN_A_NUR
Medical Necessity Information: It is in your best interest to select a reason for this procedure from the list below. All of these items fulfill various CMS LCD requirements except the new and changing color options. Add 52 Modifier (Optional): no Post-Care Instructions: I reviewed with the patient in detail post-care instructions. Patient is to wear sunprotection, and avoid picking at any of the treated lesions. Pt may apply Vaseline to crusted or scabbing areas. Include Z78.9 (Other Specified Conditions Influencing Health Status) As An Associated Diagnosis?: Yes Medical Necessity Clause: This procedure was medically necessary because the lesions that were treated were: painful, interferes with walking and running Detail Level: Detailed Consent: The patient's consent was obtained including but not limited to risks of crusting, scabbing, blistering, scarring, darker or lighter pigmentary change, recurrence, incomplete removal and infection. Keep patient informed in care.

## 2020-10-10 NOTE — ED ADULT NURSE NOTE - OBJECTIVE STATEMENT
Pt a&ox3, calm and cooperative. Pt c/o of sudden onset of abdominal pain last night, relief when self induced vomit. no stool or flatus about two days ago. Pt denies sob, chest pain, diarrhea, f/c. Pt hx of afib, on warfarin, bypass, triple A, pacemaker, depression and hernia surgery. Respirations even/unlabored, nad noted, 20g iv to right wrist, labs drawn and sent. MD at bedside to eval. will continue to monitor

## 2020-10-10 NOTE — H&P ADULT - HISTORY OF PRESENT ILLNESS
Mr. Grey is a 65 year old man with a PMH of afib s/p PPM 2006 (replaced 2014), AAA, Aortic valve replacement in 1990/2003 for which he is on coumadin alternating 5/7.5mg, ex-lap for infection s/p AVR in 2003, anxiety/depression, HTN who presents with acute onset abdominal pain last night. Shortly after dinner yesterday he developed crampy epigastric abdominal pain with associated GERD symptoms.  He did not report nausea but did make himself vomit multiple times 2/2 GERD. He usually reports being constipated and never fully evacuating but also has not passed gas or had a bowel movement since yesterday. He denies chest pain, SOB, diarrhea, hematuria, hematochezia, fever or chills. In the ED he stated that his pain and distention has significantly improved and he continues to not feel nauseated. He did report a similar situation years ago which self resolved. Of note he has note had a colonoscopy in the past 15yrs.    In the ED the patient remains afebrile, normotensive, normocardic. Patient has a mild leukocytosis to 13, otherwise labs are within normal limits. Lipase 81, lactate 1.2. INR subtherapeutic at 1.96. Ct scan showed dilated stomach and small bowel with transition point (without evidence of mass) in the mid-ileum. Of note, it did not show AAA.

## 2020-10-10 NOTE — ED ADULT NURSE REASSESSMENT NOTE - NS ED NURSE REASSESS COMMENT FT1
As per LENNIE Hernandez, pt refusing warfarin and heparin that was prescribed by provider. MD Austin paged, pending call back.

## 2020-10-10 NOTE — H&P ADULT - NSICDXPASTMEDICALHX_GEN_ALL_CORE_FT
PAST MEDICAL HISTORY:  AAA (abdominal aortic aneurysm)     Afib     Anxiety     Bicuspid aortic valve     Chronic shortness of breath with exertion    Depression taking MAOI    Glaucoma     Hepatitis B remote history; while in [facility]    Long term current use of anticoagulant     Mechanical heart valve present [facility]  - pt is unsure of maker    Pacemaker [id number]  implanted 2006 w/ generator change 2014       Right inguinal hernia     Subdural hematoma remote history

## 2020-10-10 NOTE — CONSULT NOTE ADULT - ASSESSMENT
65M with CAD, ? CABG, s/p mechanical AVR X 2 (1990 and 2003), ex-lap for infection post last AVR, AAA,  afib on coumadin (usually alternates 5/7.5), monitors his own INR which can vary widely he states due to his diet and Vit K consumption, HTN, and s/p PPM presents with acute abdominal discomfort and back pain with dyspepsia and found to have SBO with plan for medical management. Cardiology is consulted to assist with coumadin/INR management.      D/W Dr. Russell, cardio fellow as patient is subtherapeutic on coumadin with INR in setting of mechanical AVR would start Heparin drip and bridge to coumadin for INR goal 2.5 - 3.5. Continue home meds as tolerated. Patient will require diet teaching re: coumadin and food interactions pre discharge. Cardiology will follow.      65M with CAD, ? CABG, s/p mechanical AVR X 2 (1990 and 2003), ex-lap for infection post last AVR, AAA,  afib on coumadin (usually alternates 5/7.5), monitors his own INR which can vary widely he states due to his diet and Vit K consumption, HTN, and s/p PPM presents with acute abdominal discomfort and back pain with dyspepsia and found to have SBO with plan for medical management. Cardiology is consulted to assist with coumadin/INR management.      D/W Dr. Russell, cardio fellow as patient is subtherapeutic on coumadin with INR 1.96 in setting of mechanical AVR would start Heparin drip and bridge to coumadin for INR goal 2.5 - 3.5. Continue home meds as tolerated. Patient will require diet teaching re: coumadin and food interactions pre discharge. Cardiology will follow.

## 2020-10-10 NOTE — H&P ADULT - NSHPLABSRESULTS_GEN_ALL_CORE
----------  LABORATORY DATA:  ----------                        14.2   13.28 )-----------( 261      ( 10 Oct 2020 12:44 )             46.1               10-10    138  |  101  |  14  ----------------------------<  120<H>  4.6   |  27  |  1.41<H>    Ca    10.0      10 Oct 2020 12:44    TPro  8.2  /  Alb  5.1<H>  /  TBili  0.6  /  DBili  x   /  AST  25  /  ALT  20  /  AlkPhos  93  10-10    LIVER FUNCTIONS - ( 10 Oct 2020 12:44 )  Alb: 5.1 g/dL / Pro: 8.2 g/dL / ALK PHOS: 93 u/L / ALT: 20 u/L / AST: 25 u/L / GGT: x           PT/INR - ( 10 Oct 2020 12:45 )   PT: 21.8 SEC;   INR: 1.96          PTT - ( 10 Oct 2020 12:45 )  PTT:45.1 SEC    CARDIAC MARKERS ( 10 Oct 2020 12:44 )  x     / x     / x     / x     / x      High Sensitivity Troponin:36 ng/L  Serum Pro-BNP: x      ----------            ----------  RADIOGRAPHIC DATA:  ---------  PACS Image: Image(s) Available (10-10-20 @ 15:43)  PACS Image: Image(s) Available (10-10-20 @ 13:41)  rad< from: CT Angio Abdomen and Pelvis w/ IV Cont (10.10.20 @ 15:43) >      COMPARISON: CT abdomen pelvis 5/20/2019    PROCEDURE:  CT Angiography of the Abdomen and Pelvis.  Arterial phase images were acquired.  Intravenous contrast: 90 ml Omnipaque 350. 10 ml discarded.  Oral contrast: None.  Sagittal and coronal reformats were performed as well as 3D (MIP) reconstructions.    FINDINGS:  LOWER CHEST: Status post median sternotomy. Partially imaged pacemaker lead. Status post aortic valve replacement. Multichamber cardiomegaly.    LIVER: Within normal limits.  BILE DUCTS: Normal caliber.  GALLBLADDER: Cholelithiasis.  SPLEEN: Within normal limits.  PANCREAS: Within normal limits.  ADRENALS: Within normal limits.  KIDNEYS/URETERS: Bilateral renal cysts. No hydronephrosis or obstructing renal calculi.    BLADDER: Within normal limits.  REPRODUCTIVE ORGANS: Prostate is enlarged.    BOWEL: There is fluid distention of the stomach with air and fluid distention of proximal and mid small bowel loops with collapsed distal small bowel loops consistent with small bowel obstruction. The transition zone is likely in the mid ileum (series 2 image 89). Since no lesion is identified this region obstruction is likely related to adhesion. There is no evidence of bowel ischemia.  Appendix is normal.  PERITONEUM: No ascites.  VESSELS: Minimal noncalcified plaque within the thoracic aorta. No abdominal aortic aneurysm. No aortic dissection. The celiac, SMA, and YANETH are patent.  RETROPERITONEUM/LYMPH NODES: No lymphadenopathy.  ABDOMINAL WALL: Multiple large periumbilical ventral hernias.. Small fat-containing left inguinal hernia.  BONES: Degenerative changes.    IMPRESSION:    Findings as described above consistent with small bowel obstruction, likely related to adhesion.    No aortic dissection or abdominal aortic aneurysm.    < end of copied text >

## 2020-10-10 NOTE — CONSULT NOTE ADULT - SUBJECTIVE AND OBJECTIVE BOX
Date of Admission: 10/10 2020    CHIEF COMPLAINT: dyspepsia aand abdominal and back pain    HISTORY OF PRESENT ILLNESS: 65M with CAD, ? CABG, s/p mechanical AVR X 2 (1990 and 2003), ex-lap for infection post last AVR, AAA,  afib on coumadin (usually alternates 5/7.5), monitors his own INR which can vary widely he states due to his diet and Vit K consumption, HTN, and s/p PPM presents with acute abdominal discomfort and back pain with dyspepsia. Patient forced himself to vomit and felt somewhat better. He states due to the back pain he came to ER as he was worried given h/o AAA. CT abd revealed SBO and symptoms have essentially resolved. Cardiology is consulted given extensive history and aid in coumadin management. Of note all patient's care has been through [place] but he is seeking followup at [facility].      Allergies    No Known Allergies    Intolerances    	    MEDICATIONS:  metoprolol tartrate Injectable 5 milliGRAM(s) IV Push every 6 hours PRN  warfarin 7.5 milliGRAM(s) Oral once        clonazePAM  Tablet 0.5 milliGRAM(s) Oral at bedtime PRN        lactated ringers. 1000 milliLiter(s) IV Continuous <Continuous>  latanoprost 0.005% Ophthalmic Solution 1 Drop(s) Both EYES at bedtime      PAST MEDICAL & SURGICAL HISTORY:  AAA (abdominal aortic aneurysm)    Glaucoma    Mechanical heart valve present  [facility]  - pt is unsure of maker    Pacemaker  [employer] 1240  implanted 2006 w/ generator change 2014       Hepatitis B  remote history; while in [facility]    Long term current use of anticoagulant    Chronic shortness of breath  with exertion    Depression  taking MAOI    Anxiety    Subdural hematoma  remote history    Bicuspid aortic valve    Afib    Right inguinal hernia    S/P aortic valve replacement with metallic valve  1990 and 2003    S/P exploratory laparotomy  after valve replacement 2003        FAMILY HISTORY:      SOCIAL HISTORY:    Smoker  denies  Alcohol denies  Drugs denies      REVIEW OF SYSTEMS:  See HPI. Otherwise, 10 point ROS done and otherwise negative.    PHYSICAL EXAM:  T(C): 37 (10-10-20 @ 15:25), Max: 37 (10-10-20 @ 15:25)  HR: 70 (10-10-20 @ 15:25) (70 - 76)  BP: 158/69 (10-10-20 @ 15:25) (145/87 - 158/69)  RR: 20 (10-10-20 @ 15:25) (16 - 20)  SpO2: 99% (10-10-20 @ 15:25) (99% - 100%)  Wt(kg): --  I&O's Summary      Appearance: Normal	  HEENT:   Normal oral mucosa, PERRL, EOMI	  Cardiovascular: nl S1S2  Respiratory: Lungs clear to auscultation	  Psychiatry: A & O x 3, Mood & affect appropriate  Gastrointestinal:  soft, NT/ND, BS +, + scar from ex lap	  Skin: No rashes, No ecchymoses, No cyanosis	  Neurologic: Non-focal  Extremities: no C/C/E        LABS:	 	                        14.2   13.28 )-----------( 261      ( 10 Oct 2020 12:44 )             46.1       10-10    138  |  101  |  14  ----------------------------<  120<H>  4.6   |  27  |  1.41<H>    Ca    10.0      10 Oct 2020 12:44    TPro  8.2  /  Alb  5.1<H>  /  TBili  0.6  /  DBili  x   /  AST  25  /  ALT  20  /  AlkPhos  93  10-10    INR subtherapeutic at 1.96    CARDIAC MARKERS:  Troponin T, High Sensitivity: 36 ng/L (10-10-20 @ 12:44)    	    ECG:  	paced rhythm  RADIOLOGY:    EXAM:  CT ANGIO ABD PELV (W)AW IC        PROCEDURE DATE:  Oct 10 2020         INTERPRETATION:  CLINICAL INFORMATION: Sudden onset abdominal pain. History of AAA.    COMPARISON: CT abdomen pelvis 5/20/2019    PROCEDURE:  CT Angiography of the Abdomen and Pelvis.  Arterial phase images were acquired.  Intravenous contrast: 90 ml Omnipaque 350. 10 ml discarded.  Oral contrast: None.  Sagittal and coronal reformats were performed as well as 3D (MIP) reconstructions.    FINDINGS:  LOWER CHEST: Status post median sternotomy. Partially imaged pacemaker lead. Status post aortic valve replacement. Multichamber cardiomegaly.    LIVER: Within normal limits.  BILE DUCTS: Normal caliber.  GALLBLADDER: Cholelithiasis.  SPLEEN: Within normal limits.  PANCREAS: Within normal limits.  ADRENALS: Within normal limits.  KIDNEYS/URETERS: Bilateral renal cysts. No hydronephrosis or obstructing renal calculi.    BLADDER: Within normal limits.  REPRODUCTIVE ORGANS: Prostate is enlarged.    BOWEL: There is fluid distention of the stomach with air and fluid distention of proximal and mid small bowel loops with collapsed distal small bowel loops consistent with small bowel obstruction. The transition zone is likely in the mid ileum (series 2 image 89). Since no lesion is identified this region obstruction is likely related to adhesion. There is no evidence of bowel ischemia.  Appendix is normal.  PERITONEUM: No ascites.  VESSELS: Minimal noncalcified plaque within the thoracic aorta. No abdominal aortic aneurysm. No aortic dissection. The celiac, SMA, and YANETH are patent.  RETROPERITONEUM/LYMPH NODES: No lymphadenopathy.  ABDOMINAL WALL: Multiple large periumbilical ventral hernias.. Small fat-containing left inguinal hernia.  BONES: Degenerative changes.    IMPRESSION:    Findings as described above consistent with small bowel obstruction, likely related to adhesion.    No aortic dissection or abdominal aortic aneurysm.            SILVANA RO M.D., RADIOLOGY RESIDENT  This document has been electronically signed.  CAIN DUBON M.D., ATTENDING RADIOLOGIST  This document has been electronically signed. Oct 10 2020  4:13PM     Date of Admission: 10/10 2020    CHIEF COMPLAINT: dyspepsia aand abdominal and back pain    HISTORY OF PRESENT ILLNESS: 65M with CAD, ? CABG, s/p mechanical AVR X 2 (1990 and 2003), ex-lap for infection post last AVR, AAA,  afib on coumadin (usually alternates 5/7.5), monitors his own INR which can vary widely he states due to his diet and Vit K consumption, HTN, and s/p PPM presents with acute abdominal discomfort and back pain with dyspepsia. Patient forced himself to vomit and felt somewhat better. He states due to the back pain he came to ER as he was worried given h/o AAA. CT abd/ pelvis revealed SBO and symptoms have essentially resolved. Cardiology is consulted given extensive history and aid in coumadin management. Of note all patient's care has been through [place] but he is seeking followup at [facility].      Allergies    No Known Allergies    Intolerances    	    MEDICATIONS:  metoprolol tartrate Injectable 5 milliGRAM(s) IV Push every 6 hours PRN  warfarin 7.5 milliGRAM(s) Oral once        clonazePAM  Tablet 0.5 milliGRAM(s) Oral at bedtime PRN        lactated ringers. 1000 milliLiter(s) IV Continuous <Continuous>  latanoprost 0.005% Ophthalmic Solution 1 Drop(s) Both EYES at bedtime      PAST MEDICAL & SURGICAL HISTORY:  AAA (abdominal aortic aneurysm)    Glaucoma    Mechanical heart valve present  [facility]  - pt is unsure of maker    Pacemaker  [employer] 1240  implanted 2006 w/ generator change 2014       Hepatitis B  remote history; while in [facility]    Long term current use of anticoagulant    Chronic shortness of breath  with exertion    Depression  taking MAOI    Anxiety    Subdural hematoma  remote history    Bicuspid aortic valve    Afib    Right inguinal hernia    S/P aortic valve replacement with metallic valve  1990 and 2003    S/P exploratory laparotomy  after valve replacement 2003        FAMILY HISTORY:      SOCIAL HISTORY:    Smoker  denies  Alcohol denies  Drugs denies      REVIEW OF SYSTEMS:  See HPI. Otherwise, 10 point ROS done and otherwise negative.    PHYSICAL EXAM:  T(C): 37 (10-10-20 @ 15:25), Max: 37 (10-10-20 @ 15:25)  HR: 70 (10-10-20 @ 15:25) (70 - 76)  BP: 158/69 (10-10-20 @ 15:25) (145/87 - 158/69)  RR: 20 (10-10-20 @ 15:25) (16 - 20)  SpO2: 99% (10-10-20 @ 15:25) (99% - 100%)  Wt(kg): --  I&O's Summary      Appearance: Normal	  HEENT:   Normal oral mucosa, PERRL, EOMI	  Cardiovascular: nl S1S2  Respiratory: Lungs clear to auscultation	  Psychiatry: A & O x 3, Mood & affect appropriate  Gastrointestinal:  soft, NT/ND, BS +, + scar from ex lap	  Skin: No rashes, No ecchymoses, No cyanosis	  Neurologic: Non-focal  Extremities: no C/C/E        LABS:	 	                        14.2   13.28 )-----------( 261      ( 10 Oct 2020 12:44 )             46.1       10-10    138  |  101  |  14  ----------------------------<  120<H>  4.6   |  27  |  1.41<H>    Ca    10.0      10 Oct 2020 12:44    TPro  8.2  /  Alb  5.1<H>  /  TBili  0.6  /  DBili  x   /  AST  25  /  ALT  20  /  AlkPhos  93  10-10    INR subtherapeutic at 1.96    CARDIAC MARKERS:  Troponin T, High Sensitivity: 36 ng/L (10-10-20 @ 12:44)    	    ECG:  	paced rhythm  RADIOLOGY:    EXAM:  CT ANGIO ABD PELV (W)AW IC        PROCEDURE DATE:  Oct 10 2020         INTERPRETATION:  CLINICAL INFORMATION: Sudden onset abdominal pain. History of AAA.    COMPARISON: CT abdomen pelvis 5/20/2019    PROCEDURE:  CT Angiography of the Abdomen and Pelvis.  Arterial phase images were acquired.  Intravenous contrast: 90 ml Omnipaque 350. 10 ml discarded.  Oral contrast: None.  Sagittal and coronal reformats were performed as well as 3D (MIP) reconstructions.    FINDINGS:  LOWER CHEST: Status post median sternotomy. Partially imaged pacemaker lead. Status post aortic valve replacement. Multichamber cardiomegaly.    LIVER: Within normal limits.  BILE DUCTS: Normal caliber.  GALLBLADDER: Cholelithiasis.  SPLEEN: Within normal limits.  PANCREAS: Within normal limits.  ADRENALS: Within normal limits.  KIDNEYS/URETERS: Bilateral renal cysts. No hydronephrosis or obstructing renal calculi.    BLADDER: Within normal limits.  REPRODUCTIVE ORGANS: Prostate is enlarged.    BOWEL: There is fluid distention of the stomach with air and fluid distention of proximal and mid small bowel loops with collapsed distal small bowel loops consistent with small bowel obstruction. The transition zone is likely in the mid ileum (series 2 image 89). Since no lesion is identified this region obstruction is likely related to adhesion. There is no evidence of bowel ischemia.  Appendix is normal.  PERITONEUM: No ascites.  VESSELS: Minimal noncalcified plaque within the thoracic aorta. No abdominal aortic aneurysm. No aortic dissection. The celiac, SMA, and YANETH are patent.  RETROPERITONEUM/LYMPH NODES: No lymphadenopathy.  ABDOMINAL WALL: Multiple large periumbilical ventral hernias.. Small fat-containing left inguinal hernia.  BONES: Degenerative changes.    IMPRESSION:    Findings as described above consistent with small bowel obstruction, likely related to adhesion.    No aortic dissection or abdominal aortic aneurysm.            SILVANA RO M.D., RADIOLOGY RESIDENT  This document has been electronically signed.  CAIN DUBON M.D., ATTENDING RADIOLOGIST  This document has been electronically signed. Oct 10 2020  4:13PM

## 2020-10-11 ENCOUNTER — TRANSCRIPTION ENCOUNTER (OUTPATIENT)
Age: 65
End: 2020-10-11

## 2020-10-11 LAB
ANION GAP SERPL CALC-SCNC: 12 MMO/L — SIGNIFICANT CHANGE UP (ref 7–14)
APTT BLD: 119.8 SEC — HIGH (ref 27–36.3)
APTT BLD: > 200 SEC — CRITICAL HIGH (ref 27–36.3)
BUN SERPL-MCNC: 17 MG/DL — SIGNIFICANT CHANGE UP (ref 7–23)
CALCIUM SERPL-MCNC: 9.4 MG/DL — SIGNIFICANT CHANGE UP (ref 8.4–10.5)
CHLORIDE SERPL-SCNC: 101 MMOL/L — SIGNIFICANT CHANGE UP (ref 98–107)
CO2 SERPL-SCNC: 25 MMOL/L — SIGNIFICANT CHANGE UP (ref 22–31)
CREAT SERPL-MCNC: 1.28 MG/DL — SIGNIFICANT CHANGE UP (ref 0.5–1.3)
GLUCOSE SERPL-MCNC: 102 MG/DL — HIGH (ref 70–99)
HBA1C BLD-MCNC: 5.7 % — HIGH (ref 4–5.6)
HCT VFR BLD CALC: 40.6 % — SIGNIFICANT CHANGE UP (ref 39–50)
HGB BLD-MCNC: 12.7 G/DL — LOW (ref 13–17)
INR BLD: 2.24 — HIGH (ref 0.88–1.16)
INR BLD: 2.47 — HIGH (ref 0.88–1.16)
MAGNESIUM SERPL-MCNC: 2 MG/DL — SIGNIFICANT CHANGE UP (ref 1.6–2.6)
MCHC RBC-ENTMCNC: 28.6 PG — SIGNIFICANT CHANGE UP (ref 27–34)
MCHC RBC-ENTMCNC: 31.3 % — LOW (ref 32–36)
MCV RBC AUTO: 91.4 FL — SIGNIFICANT CHANGE UP (ref 80–100)
NRBC # FLD: 0 K/UL — SIGNIFICANT CHANGE UP (ref 0–0)
PHOSPHATE SERPL-MCNC: 4.1 MG/DL — SIGNIFICANT CHANGE UP (ref 2.5–4.5)
PLATELET # BLD AUTO: 228 K/UL — SIGNIFICANT CHANGE UP (ref 150–400)
PMV BLD: 10.2 FL — SIGNIFICANT CHANGE UP (ref 7–13)
POTASSIUM SERPL-MCNC: 4.6 MMOL/L — SIGNIFICANT CHANGE UP (ref 3.5–5.3)
POTASSIUM SERPL-SCNC: 4.6 MMOL/L — SIGNIFICANT CHANGE UP (ref 3.5–5.3)
PROTHROM AB SERPL-ACNC: 24.8 SEC — HIGH (ref 10.6–13.6)
PROTHROM AB SERPL-ACNC: 27 SEC — HIGH (ref 10.6–13.6)
RBC # BLD: 4.44 M/UL — SIGNIFICANT CHANGE UP (ref 4.2–5.8)
RBC # FLD: 13.2 % — SIGNIFICANT CHANGE UP (ref 10.3–14.5)
SODIUM SERPL-SCNC: 138 MMOL/L — SIGNIFICANT CHANGE UP (ref 135–145)
WBC # BLD: 10.85 K/UL — HIGH (ref 3.8–10.5)
WBC # FLD AUTO: 10.85 K/UL — HIGH (ref 3.8–10.5)

## 2020-10-11 PROCEDURE — 99222 1ST HOSP IP/OBS MODERATE 55: CPT

## 2020-10-11 PROCEDURE — 99232 SBSQ HOSP IP/OBS MODERATE 35: CPT | Mod: GC

## 2020-10-11 RX ORDER — LOSARTAN POTASSIUM 100 MG/1
50 TABLET, FILM COATED ORAL
Refills: 0 | Status: DISCONTINUED | OUTPATIENT
Start: 2020-10-11 | End: 2020-10-11

## 2020-10-11 RX ORDER — HEPARIN SODIUM 5000 [USP'U]/ML
1100 INJECTION INTRAVENOUS; SUBCUTANEOUS
Qty: 25000 | Refills: 0 | Status: DISCONTINUED | OUTPATIENT
Start: 2020-10-11 | End: 2020-10-11

## 2020-10-11 RX ORDER — WARFARIN SODIUM 2.5 MG/1
5 TABLET ORAL ONCE
Refills: 0 | Status: COMPLETED | OUTPATIENT
Start: 2020-10-11 | End: 2020-10-11

## 2020-10-11 RX ORDER — LOSARTAN POTASSIUM 100 MG/1
50 TABLET, FILM COATED ORAL DAILY
Refills: 0 | Status: DISCONTINUED | OUTPATIENT
Start: 2020-10-11 | End: 2020-10-11

## 2020-10-11 RX ORDER — LOSARTAN POTASSIUM 100 MG/1
50 TABLET, FILM COATED ORAL EVERY 12 HOURS
Refills: 0 | Status: DISCONTINUED | OUTPATIENT
Start: 2020-10-11 | End: 2020-10-12

## 2020-10-11 RX ADMIN — SODIUM CHLORIDE 120 MILLILITER(S): 9 INJECTION, SOLUTION INTRAVENOUS at 05:18

## 2020-10-11 RX ADMIN — LOSARTAN POTASSIUM 50 MILLIGRAM(S): 100 TABLET, FILM COATED ORAL at 17:22

## 2020-10-11 RX ADMIN — Medication 0.5 MILLIGRAM(S): at 21:08

## 2020-10-11 RX ADMIN — SODIUM CHLORIDE 120 MILLILITER(S): 9 INJECTION, SOLUTION INTRAVENOUS at 11:28

## 2020-10-11 RX ADMIN — WARFARIN SODIUM 5 MILLIGRAM(S): 2.5 TABLET ORAL at 17:22

## 2020-10-11 RX ADMIN — LATANOPROST 1 DROP(S): 0.05 SOLUTION/ DROPS OPHTHALMIC; TOPICAL at 21:08

## 2020-10-11 RX ADMIN — HEPARIN SODIUM 11 UNIT(S)/HR: 5000 INJECTION INTRAVENOUS; SUBCUTANEOUS at 05:18

## 2020-10-11 NOTE — DISCHARGE NOTE PROVIDER - NSDCCPCAREPLAN_GEN_ALL_CORE_FT
PRINCIPAL DISCHARGE DIAGNOSIS  Diagnosis: SBO (small bowel obstruction)  Assessment and Plan of Treatment: Spontaneous resolution      SECONDARY DISCHARGE DIAGNOSES  Diagnosis: Mechanical heart valve present  Assessment and Plan of Treatment: Continue your home dose of warfarin and follow up with your doctor, continue your blood pressure medications

## 2020-10-11 NOTE — PROVIDER CONTACT NOTE (CRITICAL VALUE NOTIFICATION) - ACTION/TREATMENT ORDERED:
as per Carmelo Hanley, this is a patient specific, will call back with intervention regarding heparin gtt

## 2020-10-11 NOTE — DISCHARGE NOTE PROVIDER - HOSPITAL COURSE
10/10: Pt admitted for an adhesive SBO confirmed by CT  10/11: Patient presented with SBO, he was admitted to surgery and was made npo with IVF he was started on heparin gtt for his mechanical valve. When his bowel function returned his diet was slowly advanced as tolerated.  At this time, pt is tolerating a regular diet, ambulating and voiding. He was resumed on coumadin. Pt has been deemed stable for discharge at this time.

## 2020-10-11 NOTE — DISCHARGE NOTE PROVIDER - NSDCMRMEDTOKEN_GEN_ALL_CORE_FT
Coumadin 5 mg oral tablet: 1 tab(s) orally once a day  KlonoPIN 0.5 mg oral tablet: 1 tab(s) orally once a day (at bedtime)  LaMICtal 200 mg oral tablet: 1 tab(s) orally once a day  latanoprost 0.005% ophthalmic solution: 1 drop(s) to each affected eye once a day (at bedtime)  BOTH EYES  losartan 50 mg oral tablet: 1 tab(s) orally 2 times a day  Nardil: 30 milligram(s) orally once a day  simvastatin 20 mg oral tablet: 1 tab(s) orally once a day (at bedtime)

## 2020-10-11 NOTE — DISCHARGE NOTE PROVIDER - CARE PROVIDER_API CALL
Homer Sage  COLON/RECTAL SURGERY  [address] [place] [address]  Phone: [phone]  Fax: [phone]  Follow Up Time: 2 weeks

## 2020-10-11 NOTE — PROGRESS NOTE ADULT - ASSESSMENT
Mr. Grey is a 65 year old man with a PMH of afib s/p PPM 2006 (replaced 2014), AAA, Aortic valve replacement in 1990/2003 for which he is on coumadin alternating 5/7.5mg, ex-lap for infection s/p AVR in 2003, anxiety/depression, HTN who presents with adhesive SBO. Patient feels much better now but still has not passed gas/or had a BM yet so will be admitted overnight.    Plan:  - NPO/IVF (LR)  - Pain meds upon exam  - patient subtx on coumadin, cards following  - recommended heparin transition to coumadin (dosing 7.5mg tonight)  - No need for NGT, patient no longer nauseated: patient understands risks  - PRN metoprolol 5mg IV PRN for systolic  - hold PO psych meds  - AM labs      [id number] Mr. Grey is a 65 year old man with a PMH of afib s/p PPM 2006 (replaced 2014), AAA, Aortic valve replacement in 1990/2003 for which he is on coumadin alternating 5/7.5mg, ex-lap for infection s/p AVR in 2003, anxiety/depression, HTN who presents with adhesive SBO.   Plan:  - Advance to [facility]  - Pain meds with exam  - patient subtx on coumadin, cards following; 5mg coumadin today  - F/u PTT/INR  - No need for NGT, patient no longer nauseated: patient understands risks  - PRN metoprolol 5mg IV PRN for systolic  - hold PO psych meds      A TEAM SURGERY [id number]

## 2020-10-12 ENCOUNTER — TRANSCRIPTION ENCOUNTER (OUTPATIENT)
Age: 65
End: 2020-10-12

## 2020-10-12 VITALS
TEMPERATURE: 97 F | RESPIRATION RATE: 18 BRPM | HEART RATE: 70 BPM | OXYGEN SATURATION: 99 % | SYSTOLIC BLOOD PRESSURE: 146 MMHG | DIASTOLIC BLOOD PRESSURE: 71 MMHG

## 2020-10-12 LAB
ANION GAP SERPL CALC-SCNC: 9 MMO/L — SIGNIFICANT CHANGE UP (ref 7–14)
APTT BLD: 40 SEC — HIGH (ref 27–36.3)
BUN SERPL-MCNC: 14 MG/DL — SIGNIFICANT CHANGE UP (ref 7–23)
CALCIUM SERPL-MCNC: 8.7 MG/DL — SIGNIFICANT CHANGE UP (ref 8.4–10.5)
CHLORIDE SERPL-SCNC: 105 MMOL/L — SIGNIFICANT CHANGE UP (ref 98–107)
CO2 SERPL-SCNC: 24 MMOL/L — SIGNIFICANT CHANGE UP (ref 22–31)
CREAT SERPL-MCNC: 1.44 MG/DL — HIGH (ref 0.5–1.3)
GLUCOSE SERPL-MCNC: 99 MG/DL — SIGNIFICANT CHANGE UP (ref 70–99)
HCT VFR BLD CALC: 38.8 % — LOW (ref 39–50)
HGB BLD-MCNC: 12.1 G/DL — LOW (ref 13–17)
INR BLD: 3.39 — HIGH (ref 0.88–1.16)
MAGNESIUM SERPL-MCNC: 1.8 MG/DL — SIGNIFICANT CHANGE UP (ref 1.6–2.6)
MCHC RBC-ENTMCNC: 28.2 PG — SIGNIFICANT CHANGE UP (ref 27–34)
MCHC RBC-ENTMCNC: 31.2 % — LOW (ref 32–36)
MCV RBC AUTO: 90.4 FL — SIGNIFICANT CHANGE UP (ref 80–100)
NRBC # FLD: 0 K/UL — SIGNIFICANT CHANGE UP (ref 0–0)
PHOSPHATE SERPL-MCNC: 3.9 MG/DL — SIGNIFICANT CHANGE UP (ref 2.5–4.5)
PLATELET # BLD AUTO: 194 K/UL — SIGNIFICANT CHANGE UP (ref 150–400)
PMV BLD: 10.1 FL — SIGNIFICANT CHANGE UP (ref 7–13)
POTASSIUM SERPL-MCNC: 4.2 MMOL/L — SIGNIFICANT CHANGE UP (ref 3.5–5.3)
POTASSIUM SERPL-SCNC: 4.2 MMOL/L — SIGNIFICANT CHANGE UP (ref 3.5–5.3)
PROTHROM AB SERPL-ACNC: 36.5 SEC — HIGH (ref 10.6–13.6)
RBC # BLD: 4.29 M/UL — SIGNIFICANT CHANGE UP (ref 4.2–5.8)
RBC # FLD: 13.2 % — SIGNIFICANT CHANGE UP (ref 10.3–14.5)
SODIUM SERPL-SCNC: 138 MMOL/L — SIGNIFICANT CHANGE UP (ref 135–145)
WBC # BLD: 7.89 K/UL — SIGNIFICANT CHANGE UP (ref 3.8–10.5)
WBC # FLD AUTO: 7.89 K/UL — SIGNIFICANT CHANGE UP (ref 3.8–10.5)

## 2020-10-12 PROCEDURE — 99238 HOSP IP/OBS DSCHRG MGMT 30/<: CPT

## 2020-10-12 RX ORDER — WARFARIN SODIUM 2.5 MG/1
4 TABLET ORAL ONCE
Refills: 0 | Status: DISCONTINUED | OUTPATIENT
Start: 2020-10-12 | End: 2020-10-12

## 2020-10-12 RX ORDER — WARFARIN SODIUM 2.5 MG/1
7.5 TABLET ORAL ONCE
Refills: 0 | Status: DISCONTINUED | OUTPATIENT
Start: 2020-10-12 | End: 2020-10-12

## 2020-10-12 RX ADMIN — LOSARTAN POTASSIUM 50 MILLIGRAM(S): 100 TABLET, FILM COATED ORAL at 05:05

## 2020-10-12 NOTE — PROGRESS NOTE ADULT - ASSESSMENT
Mr. Grey is a 65 year old man with a PMH of afib s/p PPM 2006 (replaced 2014), AAA, Aortic valve replacement in 1990/2003 for which he is on coumadin alternating 5/7.5mg, ex-lap for infection s/p AVR in 2003, anxiety/depression, HTN who presents with adhesive SBO.   Plan:  - C/w LRD  - Pain meds with exam  - patient subtx on coumadin, cards following; 7.5mg coumadin today  - F/u PTT/INR  - PRN metoprolol 5mg IV PRN for systolic      A TEAM SURGERY [id number]     Mr. Grey is a 65 year old man with a PMH of afib s/p PPM 2006 (replaced 2014), AAA, Aortic valve replacement in 1990/2003 for which he is on coumadin alternating 5/7.5mg, ex-lap for infection s/p AVR in 2003, anxiety/depression, HTN who presents with adhesive SBO.   Plan:  - C/w LRD  - Pain meds with exam  - patient subtx on coumadin, cards following; 7.5mg coumadin today  - F/u PTT/INR  - PRN metoprolol 5mg IV PRN for systolic  -DC home today      A TEAM SURGERY [id number]

## 2020-10-12 NOTE — PROGRESS NOTE ADULT - SUBJECTIVE AND OBJECTIVE BOX
Interval Events: No acute events overnight    S: Patient doing well, states he feels OK. Endorses passing flatus and having 4 liquid BMs since yesterday. Denies fevers, chills, nausea, emesis, chest pain, SOB.    O: Vital Signs  T(C): 36.8 (10-12 @ 05:04), Max: 36.8 (10-12 @ 05:04)  HR: 70 (10-12 @ 05:04) (70 - 70)  BP: 113/59 (10-12 @ 05:04) (113/59 - 143/67)  RR: 17 (10-12 @ 05:04) (16 - 18)  SpO2: 99% (10-12 @ 05:04) (97% - 100%)  10-10-20 @ 07:01  -  10-11-20 @ 07:00  --------------------------------------------------------  IN: 798 mL / OUT: 0 mL / NET: 798 mL    10-11-20 @ 07:01  -  10-12-20 @ 06:32  --------------------------------------------------------  IN: 1187 mL / OUT: 1400 mL / NET: -213 mL      General: alert and oriented, NAD  Resp: airway patent, respirations unlabored  CVS: appears well perfused  Abdomen: soft, nontender, nondistended  Extremities: no edema  Skin: warm, dry, appropriate color                          12.7   10.85 )-----------( 228      ( 11 Oct 2020 03:16 )             40.6   10-11    138  |  101  |  17  ----------------------------<  102<H>  4.6   |  25  |  1.28    Ca    9.4      11 Oct 2020 03:16  Phos  4.1     10-11  Mg     2.0     10-11    TPro  8.2  /  Alb  5.1<H>  /  TBili  0.6  /  DBili  x   /  AST  25  /  ALT  20  /  AlkPhos  93  10-10

## 2020-10-12 NOTE — DISCHARGE NOTE NURSING/CASE MANAGEMENT/SOCIAL WORK - NSDCPNINST_GEN_ALL_CORE
Call MD with any signs of infection, persistent nausea and or vomiting, or fever, inability to pass angus or have BM. Continue to drink fluids. Follow-up with Dr. Sage in the office as instructed for post-op check as well as with PMD for continuity of care.     Call MD with any signs of infection, persistent nausea and or vomiting, or fever, inability to pass gas or have BM. Continue to drink fluids. Follow-up with Dr. Sage in the office as instructed for post-op check as well as with PMD for continuity of care.

## 2020-10-12 NOTE — DISCHARGE NOTE NURSING/CASE MANAGEMENT/SOCIAL WORK - NSDCPEPTCOWAR_GEN_ALL_CORE
Warfarin/Coumadin - Compliance/Warfarin/Coumadin - Potential for adverse drug reactions and interactions/Warfarin/Coumadin - Follow up monitoring/Warfarin/Coumadin - Dietary Advice

## 2020-10-12 NOTE — DISCHARGE NOTE NURSING/CASE MANAGEMENT/SOCIAL WORK - NSDCPNDISPN_GEN_ALL_CORE
Activities of daily living, including home environment that might     exacerbate pain or reduce effectiveness of the pain management plan of care as well as strategies to address these issues/Side effects of pain management treatment/Opioids not applicable/not prescribed/Education provided on the pain management plan of care

## 2020-10-12 NOTE — DISCHARGE NOTE NURSING/CASE MANAGEMENT/SOCIAL WORK - NSDCFUADDAPPT_GEN_ALL_CORE_FT
Follow-up with Dr. Sage in the office as instructed for post-op check as well as with PMD for continuity of care.

## 2020-10-12 NOTE — DISCHARGE NOTE NURSING/CASE MANAGEMENT/SOCIAL WORK - NSDCPECAREGIVERED_GEN_ALL_CORE
Yes/Medline and carenotes for SBO as well as DC Medications and side effects literature for patient reference.

## 2020-10-12 NOTE — DISCHARGE NOTE NURSING/CASE MANAGEMENT/SOCIAL WORK - PATIENT PORTAL LINK FT
You can access the FollowMyHealth Patient Portal offered by Health system by registering at the following website: [email or web address]. By joining [employer]’s FollowMyHealth portal, you will also be able to view your health information using other applications (apps) compatible with our system.

## 2020-10-12 NOTE — DISCHARGE NOTE NURSING/CASE MANAGEMENT/SOCIAL WORK - NSDPDISTO_GEN_ALL_CORE
Home Home/Pt A+OX 4, VS stable afebrile. Pt erin po diet, positive Bowel sounds, passing flatus. seen by MD and cleared for Dc to home as per safe DC plan.

## 2020-10-22 RX ORDER — LATANOPROST/PF 0.005 %
0.01 DROPS OPHTHALMIC (EYE)
Qty: 3 | Refills: 3 | Status: ACTIVE | COMMUNITY
Start: 1900-01-01 | End: 1900-01-01

## 2020-10-23 ENCOUNTER — APPOINTMENT (OUTPATIENT)
Dept: INTERNAL MEDICINE | Facility: CLINIC | Age: 65
End: 2020-10-23
Payer: MEDICARE

## 2020-10-23 PROBLEM — I71.4 ABDOMINAL AORTIC ANEURYSM, WITHOUT RUPTURE: Chronic | Status: ACTIVE | Noted: 2020-10-10

## 2020-10-23 LAB
INR PPP: 3.39
PROTHROMBIN TIME COMMENT: NORMAL
PT BLD: 36.5

## 2020-10-23 PROCEDURE — 99441: CPT

## 2020-11-05 NOTE — ED PROVIDER NOTE - NS ED MD EM SELECTION
Prescription approved per [facility] Refill Protocol.  Marc Canchola RN, BSN    
[id number] Comprehensive

## 2020-11-18 ENCOUNTER — NON-APPOINTMENT (OUTPATIENT)
Age: 65
End: 2020-11-18

## 2020-12-17 ENCOUNTER — APPOINTMENT (OUTPATIENT)
Dept: INTERNAL MEDICINE | Facility: CLINIC | Age: 65
End: 2020-12-17
Payer: MEDICARE

## 2020-12-17 LAB
INR PPP: 4.6
PROTHROMBIN TIME COMMENT: NORMAL
PT BLD: 42.6

## 2020-12-17 PROCEDURE — 99421 OL DIG E/M SVC 5-10 MIN: CPT

## 2020-12-23 PROBLEM — Z86.69 HISTORY OF CONJUNCTIVITIS: Status: RESOLVED | Noted: 2020-03-05 | Resolved: 2020-12-23

## 2020-12-23 PROBLEM — Z87.09 HISTORY OF INFLUENZA: Status: RESOLVED | Noted: 2020-01-31 | Resolved: 2020-12-23

## 2020-12-29 ENCOUNTER — APPOINTMENT (OUTPATIENT)
Dept: INTERNAL MEDICINE | Facility: CLINIC | Age: 65
End: 2020-12-29
Payer: MEDICARE

## 2020-12-29 LAB
INR PPP: 1.4
PT BLD: 14.1

## 2020-12-29 PROCEDURE — 99441: CPT

## 2021-01-04 RX ORDER — LOSARTAN POTASSIUM 50 MG/1
50 TABLET, FILM COATED ORAL DAILY
Qty: 90 | Refills: 3 | Status: ACTIVE | COMMUNITY
Start: 1900-01-01 | End: 1900-01-01

## 2021-01-08 LAB
INR PPP: 4.3
PROTHROMBIN TIME COMMENT: NORMAL
PT BLD: 40.6

## 2021-01-21 ENCOUNTER — NON-APPOINTMENT (OUTPATIENT)
Age: 66
End: 2021-01-21

## 2021-01-21 LAB
INR PPP: 2.2
PROTHROMBIN TIME COMMENT: NORMAL
PT BLD: 21.6

## 2021-03-18 ENCOUNTER — APPOINTMENT (OUTPATIENT)
Dept: INTERNAL MEDICINE | Facility: CLINIC | Age: 66
End: 2021-03-18
Payer: MEDICARE

## 2021-03-18 LAB
INR PPP: 3.4
PROTHROMBIN TIME COMMENT: NORMAL
PT BLD: 32.4

## 2021-03-18 PROCEDURE — 99441: CPT

## 2021-05-06 ENCOUNTER — NON-APPOINTMENT (OUTPATIENT)
Age: 66
End: 2021-05-06

## 2021-06-23 ENCOUNTER — APPOINTMENT (OUTPATIENT)
Dept: INTERNAL MEDICINE | Facility: CLINIC | Age: 66
End: 2021-06-23
Payer: MEDICARE

## 2021-06-23 PROCEDURE — 99441: CPT

## 2021-08-05 ENCOUNTER — APPOINTMENT (OUTPATIENT)
Dept: INTERNAL MEDICINE | Facility: CLINIC | Age: 66
End: 2021-08-05
Payer: MEDICARE

## 2021-08-05 LAB
INR PPP: 2.3
PROTHROMBIN TIME COMMENT: NORMAL
PT BLD: 23.6

## 2021-08-05 PROCEDURE — 99441: CPT

## 2021-10-15 ENCOUNTER — APPOINTMENT (OUTPATIENT)
Dept: INTERNAL MEDICINE | Facility: CLINIC | Age: 66
End: 2021-10-15
Payer: MEDICARE

## 2021-10-15 DIAGNOSIS — R79.1 ABNORMAL COAGULATION PROFILE: ICD-10-CM

## 2021-10-15 LAB
INR PPP: 4.5
PROTHROMBIN TIME COMMENT: NORMAL
PT BLD: 44

## 2021-10-15 PROCEDURE — 99441: CPT

## 2021-10-28 ENCOUNTER — APPOINTMENT (OUTPATIENT)
Dept: INTERNAL MEDICINE | Facility: CLINIC | Age: 66
End: 2021-10-28
Payer: MEDICARE

## 2021-10-28 PROCEDURE — 99441: CPT

## 2021-11-03 LAB
INR PPP: 2.2
PROTHROMBIN TIME COMMENT: NORMAL
PT BLD: 22.3

## 2021-11-09 ENCOUNTER — APPOINTMENT (OUTPATIENT)
Dept: INTERNAL MEDICINE | Facility: CLINIC | Age: 66
End: 2021-11-09

## 2021-11-11 ENCOUNTER — APPOINTMENT (OUTPATIENT)
Dept: INTERNAL MEDICINE | Facility: CLINIC | Age: 66
End: 2021-11-11
Payer: MEDICARE

## 2021-11-11 LAB
INR PPP: 3
PROTHROMBIN TIME COMMENT: NORMAL
PT BLD: 30.2

## 2021-11-11 PROCEDURE — 99441: CPT

## 2021-12-02 ENCOUNTER — APPOINTMENT (OUTPATIENT)
Dept: INTERNAL MEDICINE | Facility: CLINIC | Age: 66
End: 2021-12-02
Payer: MEDICARE

## 2021-12-02 LAB
INR PPP: 3
PROTHROMBIN TIME COMMENT: NORMAL
PT BLD: 29.9

## 2021-12-02 PROCEDURE — 99441: CPT

## 2021-12-03 DIAGNOSIS — Z01.818 ENCOUNTER FOR OTHER PREPROCEDURAL EXAMINATION: ICD-10-CM

## 2021-12-04 ENCOUNTER — APPOINTMENT (OUTPATIENT)
Dept: DISASTER EMERGENCY | Facility: CLINIC | Age: 66
End: 2021-12-04

## 2021-12-05 LAB — SARS-COV-2 N GENE NPH QL NAA+PROBE: NOT DETECTED

## 2021-12-06 ENCOUNTER — APPOINTMENT (OUTPATIENT)
Dept: INTERNAL MEDICINE | Facility: CLINIC | Age: 66
End: 2021-12-06
Payer: MEDICARE

## 2021-12-06 VITALS
DIASTOLIC BLOOD PRESSURE: 80 MMHG | HEART RATE: 86 BPM | OXYGEN SATURATION: 98 % | BODY MASS INDEX: 25.07 KG/M2 | SYSTOLIC BLOOD PRESSURE: 140 MMHG | TEMPERATURE: 97.9 F | WEIGHT: 156 LBS | HEIGHT: 66 IN

## 2021-12-06 VITALS — DIASTOLIC BLOOD PRESSURE: 80 MMHG | SYSTOLIC BLOOD PRESSURE: 136 MMHG

## 2021-12-06 DIAGNOSIS — Z95.1 PRESENCE OF AORTOCORONARY BYPASS GRAFT: ICD-10-CM

## 2021-12-06 DIAGNOSIS — Z95.2 PRESENCE OF PROSTHETIC HEART VALVE: ICD-10-CM

## 2021-12-06 DIAGNOSIS — Z95.0 PRESENCE OF CARDIAC PACEMAKER: ICD-10-CM

## 2021-12-06 DIAGNOSIS — R73.03 PREDIABETES.: ICD-10-CM

## 2021-12-06 DIAGNOSIS — R06.00 DYSPNEA, UNSPECIFIED: ICD-10-CM

## 2021-12-06 DIAGNOSIS — I10 ESSENTIAL (PRIMARY) HYPERTENSION: ICD-10-CM

## 2021-12-06 DIAGNOSIS — E55.9 VITAMIN D DEFICIENCY, UNSPECIFIED: ICD-10-CM

## 2021-12-06 DIAGNOSIS — Z00.00 ENCOUNTER FOR GENERAL ADULT MEDICAL EXAMINATION W/OUT ABNORMAL FINDINGS: ICD-10-CM

## 2021-12-06 DIAGNOSIS — M77.31 CALCANEAL SPUR, RIGHT FOOT: ICD-10-CM

## 2021-12-06 DIAGNOSIS — F31.70 BIPOLAR DISORDER, CURRENTLY IN REMISSION, MOST RECENT EPISODE UNSPECIFIED: ICD-10-CM

## 2021-12-06 PROCEDURE — 71046 X-RAY EXAM CHEST 2 VIEWS: CPT

## 2021-12-06 PROCEDURE — 99214 OFFICE O/P EST MOD 30 MIN: CPT | Mod: 25

## 2021-12-06 PROCEDURE — 94010 BREATHING CAPACITY TEST: CPT

## 2021-12-06 PROCEDURE — G0439: CPT

## 2021-12-06 RX ORDER — AMLODIPINE BESYLATE 2.5 MG/1
2.5 TABLET ORAL DAILY
Qty: 90 | Refills: 2 | Status: ACTIVE | COMMUNITY
Start: 2021-12-06

## 2021-12-06 RX ORDER — CIPROFLOXACIN AND DEXAMETHASONE 3; 1 MG/ML; MG/ML
0.3-0.1 SUSPENSION/ DROPS AURICULAR (OTIC) TWICE DAILY
Qty: 1 | Refills: 0 | Status: DISCONTINUED | COMMUNITY
Start: 2020-03-05 | End: 2021-12-06

## 2021-12-06 NOTE — REVIEW OF SYSTEMS
[Postnasal Drip] : postnasal drip [Nasal Discharge] : nasal discharge [Dyspnea on Exertion] : dyspnea on exertion [Negative] : Psychiatric [FreeTextEntry6] : Chronic dyspnea with exertion  [FreeTextEntry9] : Complains of pain in the right heel, only when standing

## 2021-12-06 NOTE — DATA REVIEWED
[FreeTextEntry1] : Electrocardiogram is refused by the patient, is done every 6 months by his electrophysiologist.\par \par Spirometry reveals a predicted FEV1 of 54%, measured is 54%.  Session quality was poor because of scoliosis and restrictive lung disorder post surgery.\par \par Chest x-ray reveals moderate elevation of the right hemidiaphragm and a moderate scoliosis of the thoracic spine.  There is a pacemaker overlying the left lung field.  The valve is visualized on the lateral view.  Sternal hardware is present\par \par No active disease.\par \par Blood work performed on 12/2/2021 is satisfactory.  The patient notes that he has been prediabetic in the past though his fasting sugar at this point is well in the normal range

## 2021-12-06 NOTE — HEALTH RISK ASSESSMENT
[No] : In the past 12 months have you used drugs other than those required for medical reasons? No [No falls in past year] : Patient reported no falls in the past year [0] : 2) Feeling down, depressed, or hopeless: Not at all (0) [Patient declined colonoscopy] : Patient declined colonoscopy [HIV test declined] : HIV test declined [Hepatitis C test declined] : Hepatitis C test declined [None] : None [Behavioral] : behavioral [Unemployed] : unemployed [Feels Safe at Home] : Feels safe at home [Fully functional (bathing, dressing, toileting, transferring, walking, feeding)] : Fully functional (bathing, dressing, toileting, transferring, walking, feeding) [Fully functional (using the telephone, shopping, preparing meals, housekeeping, doing laundry, using] : Fully functional and needs no help or supervision to perform IADLs (using the telephone, shopping, preparing meals, housekeeping, doing laundry, using transportation, managing medications and managing finances) [Reports normal functional visual acuity (ie: able to read med bottle)] : Reports normal functional visual acuity [Smoke Detector] : smoke detector [Carbon Monoxide Detector] : carbon monoxide detector [Seat Belt] :  uses seat belt [Sunscreen] : uses sunscreen [TB Exposure] : is being exposed to tuberculosis [Patient/Caregiver not ready to engage] : , patient/caregiver not ready to engage [FreeTextEntry1] : General health [] : No [de-identified] : Cardiac physiology for pacemaker interrogation [de-identified] : Limited activity [de-identified] : Generally healthy [[id number]] : 0 [Change in mental status noted] : No change in mental status noted [Language] : denies difficulty with language [Behavior] : denies difficulty with behavior [Learning/Retaining New Information] : denies difficulty learning/retaining new information [Handling Complex Tasks] : denies difficulty handling complex tasks [Reasoning] : denies difficulty with reasoning [Spatial Ability and Orientation] : denies difficulty with spatial ability and orientation [Sexually Active] : not sexually active [High Risk Behavior] : no high risk behavior [Reports changes in hearing] : Reports no changes in hearing [Reports changes in vision] : Reports no changes in vision [Guns at Home] : no guns at home [Travel to Developing Areas] : does not  travel to developing areas [Caregiver Concerns] : does not have caregiver concerns [ColonoscopyDate] : 20/2021 [AdvancecareDate] : 12/2021

## 2021-12-06 NOTE — HISTORY OF PRESENT ILLNESS
[FreeTextEntry1] : Comprehensive annual examination [de-identified] : Feels well.  He has had longstanding dyspnea with exertion.  No cough or chest pain.  His past medical history includes valve replacement, coronary artery disease and a pacemaker.  He is aware at the time of cardiac surgery that he had atelectasis of the right lung.  He also has a scoliosis that developed at some point during life.

## 2021-12-06 NOTE — ASSESSMENT
[FreeTextEntry1] : Cardiac status seems well compensated.  Blood pressure control is borderline.  Coumadin levels will be checked monthly.  His blood pressure will be checked by his cardio physiologist in 3/22.  Consideration can be given to increasing amlodipine to 5 mg daily..\par \par He has significant restrictive lung disorder which will only be improved by improved conditioning.-More exercise.\par \par The right calcaneal bone spur can be addressed by a podiatrist should he decide to pursue this.\par \par Hemoglobin A1c and vitamin D levels pending.\par \par CPE in 1 year with spirometry and chest x-ray.

## 2021-12-07 LAB
25(OH)D3 SERPL-MCNC: 42.1 NG/ML
ESTIMATED AVERAGE GLUCOSE: 117 MG/DL
HBA1C MFR BLD HPLC: 5.7 %

## 2022-01-27 ENCOUNTER — NON-APPOINTMENT (OUTPATIENT)
Age: 67
End: 2022-01-27

## 2022-01-27 ENCOUNTER — APPOINTMENT (OUTPATIENT)
Dept: INTERNAL MEDICINE | Facility: CLINIC | Age: 67
End: 2022-01-27
Payer: MEDICARE

## 2022-01-27 DIAGNOSIS — I48.91 UNSPECIFIED ATRIAL FIBRILLATION: ICD-10-CM

## 2022-01-27 LAB
INR PPP: 2.3
PROTHROMBIN TIME COMMENT: NORMAL
PT BLD: 21.9

## 2022-01-27 PROCEDURE — 99441: CPT

## 2022-04-21 ENCOUNTER — APPOINTMENT (OUTPATIENT)
Dept: INTERNAL MEDICINE | Facility: CLINIC | Age: 67
End: 2022-04-21
Payer: MEDICARE

## 2022-04-21 DIAGNOSIS — Z79.01 LONG TERM (CURRENT) USE OF ANTICOAGULANTS: ICD-10-CM

## 2022-04-21 PROCEDURE — 99441: CPT

## 2022-04-22 PROBLEM — Z79.01 ANTICOAGULATED ON WARFARIN: Status: ACTIVE | Noted: 2019-06-10

## 2022-04-28 ENCOUNTER — APPOINTMENT (OUTPATIENT)
Dept: INTERNAL MEDICINE | Facility: CLINIC | Age: 67
End: 2022-04-28

## 2022-04-28 LAB
INR PPP: 3.2
PROTHROMBIN TIME COMMENT: NORMAL
PT BLD: 30.3

## 2022-07-13 LAB
INR PPP: 1.8
PROTHROMBIN TIME COMMENT: NORMAL
PT BLD: 17.7

## 2022-08-10 LAB
INR PPP: 3
PT BLD: 28.6

## 2022-09-26 ENCOUNTER — NON-APPOINTMENT (OUTPATIENT)
Age: 67
End: 2022-09-26

## 2022-09-27 ENCOUNTER — APPOINTMENT (OUTPATIENT)
Dept: INTERNAL MEDICINE | Facility: CLINIC | Age: 67
End: 2022-09-27

## 2022-09-27 PROCEDURE — 93793 ANTICOAG MGMT PT WARFARIN: CPT

## 2022-09-27 PROCEDURE — 85610 PROTHROMBIN TIME: CPT | Mod: QW

## 2022-09-29 ENCOUNTER — APPOINTMENT (OUTPATIENT)
Dept: INTERNAL MEDICINE | Facility: CLINIC | Age: 67
End: 2022-09-29

## 2022-09-29 PROCEDURE — 85610 PROTHROMBIN TIME: CPT | Mod: QW

## 2022-09-29 PROCEDURE — 93793 ANTICOAG MGMT PT WARFARIN: CPT

## 2022-09-30 ENCOUNTER — NON-APPOINTMENT (OUTPATIENT)
Age: 67
End: 2022-09-30

## 2022-10-21 ENCOUNTER — NON-APPOINTMENT (OUTPATIENT)
Age: 67
End: 2022-10-21

## 2022-10-29 ENCOUNTER — NON-APPOINTMENT (OUTPATIENT)
Age: 67
End: 2022-10-29

## 2022-11-21 ENCOUNTER — NON-APPOINTMENT (OUTPATIENT)
Age: 67
End: 2022-11-21

## 2022-11-23 ENCOUNTER — NON-APPOINTMENT (OUTPATIENT)
Age: 67
End: 2022-11-23

## 2022-12-08 ENCOUNTER — NON-APPOINTMENT (OUTPATIENT)
Age: 67
End: 2022-12-08

## 2022-12-08 ENCOUNTER — APPOINTMENT (OUTPATIENT)
Dept: INTERNAL MEDICINE | Facility: CLINIC | Age: 67
End: 2022-12-08

## 2022-12-09 ENCOUNTER — OFFICE (OUTPATIENT)
Dept: URBAN - METROPOLITAN AREA CLINIC 90 | Facility: CLINIC | Age: 67
Setting detail: OPHTHALMOLOGY
End: 2022-12-09
Payer: COMMERCIAL

## 2022-12-09 DIAGNOSIS — H35.033: ICD-10-CM

## 2022-12-09 DIAGNOSIS — H25.13: ICD-10-CM

## 2022-12-09 DIAGNOSIS — H02.831: ICD-10-CM

## 2022-12-09 DIAGNOSIS — H57.02: ICD-10-CM

## 2022-12-09 DIAGNOSIS — H40.1122: ICD-10-CM

## 2022-12-09 DIAGNOSIS — H16.223: ICD-10-CM

## 2022-12-09 DIAGNOSIS — H40.1111: ICD-10-CM

## 2022-12-09 PROCEDURE — 92014 COMPRE OPH EXAM EST PT 1/>: CPT | Performed by: OPHTHALMOLOGY

## 2022-12-09 PROCEDURE — 92083 EXTENDED VISUAL FIELD XM: CPT | Performed by: OPHTHALMOLOGY

## 2022-12-09 PROCEDURE — 92133 CPTRZD OPH DX IMG PST SGM ON: CPT | Performed by: OPHTHALMOLOGY

## 2022-12-09 ASSESSMENT — REFRACTION_CURRENTRX
OS_OVR_VA: 20/
OS_CYLINDER: SPH
OD_SPHERE: +2.00
OD_OVR_VA: 20/
OS_VPRISM_DIRECTION: PROGS
OS_CYLINDER: SPHERE
OS_SPHERE: -0.75
OS_AXIS: 180
OD_AXIS: 067
OD_ADD: +2.50
OD_CYLINDER: -0.50
OS_VPRISM_DIRECTION: PROGS
OS_ADD: +3.00
OD_VPRISM_DIRECTION: PROGS
OS_ADD: +2.50
OD_ADD: +3.00
OD_SPHERE: +2.00
OD_AXIS: 066
OD_VPRISM_DIRECTION: PROGS
OD_OVR_VA: 20/
OD_CYLINDER: -0.75
OS_SPHERE: -0.75
OS_OVR_VA: 20/
OS_AXIS: 180

## 2022-12-09 ASSESSMENT — KERATOMETRY
METHOD_AUTO_MANUAL: AUTO
OS_K1POWER_DIOPTERS: 42.50
OS_K2POWER_DIOPTERS: 43.00
OD_K2POWER_DIOPTERS: 44.50
OD_AXISANGLE_DEGREES: 112
OS_AXISANGLE_DEGREES: 146
OD_K1POWER_DIOPTERS: 43.75

## 2022-12-09 ASSESSMENT — REFRACTION_MANIFEST
OD_AXIS: 065
OD_SPHERE: +1.75
OS_SPHERE: -1.50
OS_CYLINDER: -0.50
OD_CYLINDER: -0.50
OD_VA2: 20/20
OS_VA2: 20/20
OS_ADD: +2.50
OD_VA1: 20/20
OD_ADD: +2.50
OS_AXIS: 170
OS_VA1: 20/20

## 2022-12-09 ASSESSMENT — AXIALLENGTH_DERIVED
OD_AL: 22.8051
OD_AL: 22.7144
OS_AL: 24.4843
OS_AL: 24.5897

## 2022-12-09 ASSESSMENT — REFRACTION_AUTOREFRACTION
OD_AXIS: 072
OS_SPHERE: -1.25
OD_SPHERE: +2.00
OS_AXIS: 178
OD_CYLINDER: -0.50
OS_CYLINDER: -0.50

## 2022-12-09 ASSESSMENT — LID POSITION - DERMATOCHALASIS: OD_DERMATOCHALASIS: RUL 1+

## 2022-12-09 ASSESSMENT — TONOMETRY
OS_IOP_MMHG: 11
OD_IOP_MMHG: 11

## 2022-12-09 ASSESSMENT — CONFRONTATIONAL VISUAL FIELD TEST (CVF)
OS_FINDINGS: FULL
OD_FINDINGS: FULL

## 2022-12-09 ASSESSMENT — SUPERFICIAL PUNCTATE KERATITIS (SPK)
OS_SPK: T
OD_SPK: 1+

## 2022-12-09 ASSESSMENT — PACHYMETRY
OS_CT_UM: 567
OD_CT_CORRECTION: -1
OD_CT_UM: 562
OS_CT_CORRECTION: -1

## 2022-12-09 ASSESSMENT — VISUAL ACUITY
OS_BCVA: 20/20-1
OD_BCVA: 20/30-2

## 2022-12-09 ASSESSMENT — SPHEQUIV_DERIVED
OS_SPHEQUIV: -1.75
OD_SPHEQUIV: 1.75
OS_SPHEQUIV: -1.5
OD_SPHEQUIV: 1.5

## 2022-12-13 ENCOUNTER — NON-APPOINTMENT (OUTPATIENT)
Age: 67
End: 2022-12-13

## 2023-01-03 ENCOUNTER — NON-APPOINTMENT (OUTPATIENT)
Age: 68
End: 2023-01-03

## 2023-01-03 LAB — INR PPP: 4.1

## 2023-01-13 ENCOUNTER — NON-APPOINTMENT (OUTPATIENT)
Age: 68
End: 2023-01-13

## 2023-02-14 ENCOUNTER — NON-APPOINTMENT (OUTPATIENT)
Age: 68
End: 2023-02-14

## 2023-02-14 LAB
INR PPP: 4.7
PT BLD: 42.5

## 2023-02-15 ENCOUNTER — APPOINTMENT (OUTPATIENT)
Dept: MEDICATION MANAGEMENT | Facility: CLINIC | Age: 68
End: 2023-02-15

## 2023-02-15 ENCOUNTER — OUTPATIENT (OUTPATIENT)
Dept: OUTPATIENT SERVICES | Facility: HOSPITAL | Age: 68
LOS: 1 days | End: 2023-02-15
Payer: MEDICARE

## 2023-02-15 DIAGNOSIS — Z98.890 OTHER SPECIFIED POSTPROCEDURAL STATES: Chronic | ICD-10-CM

## 2023-02-15 DIAGNOSIS — I48.91 UNSPECIFIED ATRIAL FIBRILLATION: ICD-10-CM

## 2023-02-15 DIAGNOSIS — Z95.4 PRESENCE OF OTHER HEART-VALVE REPLACEMENT: Chronic | ICD-10-CM

## 2023-02-15 DIAGNOSIS — Z79.01 LONG TERM (CURRENT) USE OF ANTICOAGULANTS: ICD-10-CM

## 2023-02-15 PROCEDURE — G0463: CPT

## 2023-02-16 DIAGNOSIS — Z79.01 LONG TERM (CURRENT) USE OF ANTICOAGULANTS: ICD-10-CM

## 2023-02-16 DIAGNOSIS — I48.91 UNSPECIFIED ATRIAL FIBRILLATION: ICD-10-CM

## 2023-02-21 ENCOUNTER — APPOINTMENT (OUTPATIENT)
Dept: MEDICATION MANAGEMENT | Facility: CLINIC | Age: 68
End: 2023-02-21

## 2023-02-21 ENCOUNTER — OUTPATIENT (OUTPATIENT)
Dept: OUTPATIENT SERVICES | Facility: HOSPITAL | Age: 68
LOS: 1 days | End: 2023-02-21
Payer: MEDICARE

## 2023-02-21 DIAGNOSIS — Z79.01 LONG TERM (CURRENT) USE OF ANTICOAGULANTS: ICD-10-CM

## 2023-02-21 DIAGNOSIS — I48.91 UNSPECIFIED ATRIAL FIBRILLATION: ICD-10-CM

## 2023-02-21 DIAGNOSIS — Z95.4 PRESENCE OF OTHER HEART-VALVE REPLACEMENT: Chronic | ICD-10-CM

## 2023-02-21 DIAGNOSIS — Z98.890 OTHER SPECIFIED POSTPROCEDURAL STATES: Chronic | ICD-10-CM

## 2023-02-21 LAB
INR PPP: 2.2
PT BLD: 21.4

## 2023-02-21 PROCEDURE — G0463: CPT

## 2023-02-22 DIAGNOSIS — Z79.01 LONG TERM (CURRENT) USE OF ANTICOAGULANTS: ICD-10-CM

## 2023-02-22 DIAGNOSIS — I48.91 UNSPECIFIED ATRIAL FIBRILLATION: ICD-10-CM

## 2023-03-03 ENCOUNTER — APPOINTMENT (OUTPATIENT)
Dept: MEDICATION MANAGEMENT | Facility: CLINIC | Age: 68
End: 2023-03-03

## 2023-03-03 ENCOUNTER — OUTPATIENT (OUTPATIENT)
Dept: OUTPATIENT SERVICES | Facility: HOSPITAL | Age: 68
LOS: 1 days | End: 2023-03-03
Payer: MEDICARE

## 2023-03-03 DIAGNOSIS — I48.91 UNSPECIFIED ATRIAL FIBRILLATION: ICD-10-CM

## 2023-03-03 DIAGNOSIS — Z79.01 LONG TERM (CURRENT) USE OF ANTICOAGULANTS: ICD-10-CM

## 2023-03-03 DIAGNOSIS — Z98.890 OTHER SPECIFIED POSTPROCEDURAL STATES: Chronic | ICD-10-CM

## 2023-03-03 DIAGNOSIS — Z95.4 PRESENCE OF OTHER HEART-VALVE REPLACEMENT: Chronic | ICD-10-CM

## 2023-03-03 LAB
INR PPP: 3.5
PT BLD: 32.3

## 2023-03-03 PROCEDURE — G0463: CPT

## 2023-03-04 DIAGNOSIS — Z79.01 LONG TERM (CURRENT) USE OF ANTICOAGULANTS: ICD-10-CM

## 2023-03-04 DIAGNOSIS — I48.91 UNSPECIFIED ATRIAL FIBRILLATION: ICD-10-CM

## 2023-03-16 ENCOUNTER — APPOINTMENT (OUTPATIENT)
Dept: MEDICATION MANAGEMENT | Facility: CLINIC | Age: 68
End: 2023-03-16

## 2023-06-15 ENCOUNTER — OFFICE (OUTPATIENT)
Dept: URBAN - METROPOLITAN AREA CLINIC 90 | Facility: CLINIC | Age: 68
Setting detail: OPHTHALMOLOGY
End: 2023-06-15
Payer: COMMERCIAL

## 2023-06-15 DIAGNOSIS — H40.1111: ICD-10-CM

## 2023-06-15 DIAGNOSIS — H40.1122: ICD-10-CM

## 2023-06-15 DIAGNOSIS — H25.13: ICD-10-CM

## 2023-06-15 DIAGNOSIS — H52.4: ICD-10-CM

## 2023-06-15 DIAGNOSIS — H35.033: ICD-10-CM

## 2023-06-15 PROCEDURE — 92015 DETERMINE REFRACTIVE STATE: CPT | Performed by: OPHTHALMOLOGY

## 2023-06-15 PROCEDURE — 92014 COMPRE OPH EXAM EST PT 1/>: CPT | Performed by: OPHTHALMOLOGY

## 2023-06-15 PROCEDURE — 92250 FUNDUS PHOTOGRAPHY W/I&R: CPT | Performed by: OPHTHALMOLOGY

## 2023-06-15 ASSESSMENT — AXIALLENGTH_DERIVED
OS_AL: 23.9579
OD_AL: 22.8507
OD_AL: 22.8507
OD_AL: 22.8051
OD_AL: 22.7597
OS_AL: 23.8082

## 2023-06-15 ASSESSMENT — REFRACTION_CURRENTRX
OS_CYLINDER: SPH
OS_OVR_VA: 20/
OS_SPHERE: -0.75
OS_VPRISM_DIRECTION: PROGS
OS_OVR_VA: 20/
OS_CYLINDER: SPHERE
OD_CYLINDER: -0.75
OD_VPRISM_DIRECTION: PROGS
OS_SPHERE: -0.75
OD_ADD: +2.50
OD_AXIS: 067
OD_CYLINDER: -0.50
OD_SPHERE: +2.00
OS_AXIS: 180
OS_AXIS: 180
OS_ADD: +3.00
OD_OVR_VA: 20/
OD_SPHERE: +2.00
OD_VPRISM_DIRECTION: PROGS
OD_ADD: +3.00
OD_AXIS: 069
OS_ADD: +2.50
OD_OVR_VA: 20/
OS_VPRISM_DIRECTION: PROGS

## 2023-06-15 ASSESSMENT — REFRACTION_MANIFEST
OS_SPHERE: -1.50
OS_AXIS: 065
OD_ADD: +2.50
OD_VA1: 20/20
OS_AXIS: 065
OS_VA2: 20/25(J1)
OS_VA1: 20/30+-
OS_SPHERE: -1.25
OD_VA1: 20/20
OS_AXIS: 170
OD_ADD: +2.50
OD_VA2: 20/25(J1)
OS_VA2: 20/25(J1)
OD_CYLINDER: -0.75
OS_ADD: +2.50
OD_VA2: 20/20
OD_VA2: 20/25(J1)
OD_SPHERE: +1.75
OS_VA1: 20/30+-
OS_VA2: 20/20
OS_CYLINDER: -0.25
OS_ADD: +2.50
OD_AXIS: 065
OD_SPHERE: +1.75
OS_CYLINDER: -0.50
OD_ADD: +2.75
OS_VA1: 20/20
OD_VA1: 20/20
OS_CYLINDER: -0.25
OS_ADD: +3.00
OD_CYLINDER: -0.50
OD_SPHERE: +1.75
OD_AXIS: 070
OS_SPHERE: -1.25
OD_CYLINDER: -0.75
OD_AXIS: 070

## 2023-06-15 ASSESSMENT — PACHYMETRY
OS_CT_CORRECTION: -1
OS_CT_UM: 567
OD_CT_CORRECTION: -1
OD_CT_UM: 562

## 2023-06-15 ASSESSMENT — KERATOMETRY
OS_K2POWER_DIOPTERS: 44.75
OD_AXISANGLE_DEGREES: 115
METHOD_AUTO_MANUAL: AUTO
OD_K1POWER_DIOPTERS: 44.00
OD_K2POWER_DIOPTERS: 44.25
OS_AXISANGLE_DEGREES: 074
OS_K1POWER_DIOPTERS: 44.00

## 2023-06-15 ASSESSMENT — LID POSITION - DERMATOCHALASIS: OD_DERMATOCHALASIS: RUL 1+

## 2023-06-15 ASSESSMENT — REFRACTION_AUTOREFRACTION
OD_SPHERE: +2.00
OS_CYLINDER: -0.25
OD_CYLINDER: -0.75
OS_AXIS: 164
OS_SPHERE: -1.25
OD_AXIS: 065

## 2023-06-15 ASSESSMENT — VISUAL ACUITY
OD_BCVA: 20/30-2
OS_BCVA: 20/20-2

## 2023-06-15 ASSESSMENT — CONFRONTATIONAL VISUAL FIELD TEST (CVF)
OD_FINDINGS: FULL
OS_FINDINGS: FULL

## 2023-06-15 ASSESSMENT — SPHEQUIV_DERIVED
OD_SPHEQUIV: 1.5
OS_SPHEQUIV: -1.75
OD_SPHEQUIV: 1.375
OD_SPHEQUIV: 1.625
OD_SPHEQUIV: 1.375
OS_SPHEQUIV: -1.375

## 2023-06-15 ASSESSMENT — TONOMETRY
OS_IOP_MMHG: 10
OD_IOP_MMHG: 10

## 2023-10-29 NOTE — BRIEF OPERATIVE NOTE - ANTIBIOTIC PROTOCOL
Reason for visit   Follow-up for anemia.    History of present illness  The patient is a 72 year old female who presents for evaluation for anemia, iron deficiency noted on routine blood investigation by the primary care physician.    Subjective:   Patient denies any headaches, acute vision changes, focal neurological deficits, dizziness, lightheadedness, chest pain, complains of dyspnea on exertion.  Patient denies cough, hemoptysis, oral mucositis, abdominal pain, nausea, vomiting, diarrhea, constipation, melena, hematochezia, dysuria or hematuria.  Denies fever, chills, drenching night sweats, unintentional weight loss or fatigue.  Mood is normal, sleep is normal, appetite is fair.    Pertinent history from initial consultation:  Denies prior known history of blood dyscrasias, denies family history of the same.  Denies occupational or other hazardous chemical or radiation exposures.  Denies any specific diet or travel.    Past medical history    Herpes zoster                                                 Irritable bowel syndrome                                      Tachycardia                                                   Status post right knee replacement              06/18/2018     Past surgical history    TOTAL KNEE REPLACEMENT                                        ABLATION ATRIAL TACHYCARDIA                     2006          ABLATION ATRIAL TACHYCARDIA                     2009          ABLATION ATRIAL TACHYCARDIA                     2007          ABLATION ATRIAL TACHYCARDIA                                   ABLATION ATRIAL TACHYCARDIA                                   ESOPHAGOGASTRODUODENOSCOPY TRANSORAL FLEX W/BX* 07/24/2023    COLONOSCOPY                                     07/24/2023     Family history  Family History   Problem Relation Age of Onset   • Other Other         diverticulosis   • Heart disease Other    • Patient is unaware of any medical problems Mother    • Myocardial Infarction Father     • Congestive Heart Failure Father    • Hypertension Father    • Diabetes Father        Social history  Social History     Tobacco Use   • Smoking status: Never   • Smokeless tobacco: Never   Vaping Use   • Vaping Use: never used   Substance Use Topics   • Alcohol use: Yes     Comment: socially    • Drug use: No       Review of systems  Reta Denson MA  10/4/2023  4:37 PM  Sign when Signing Visit  Adriana Salcedo is a 72 year old female here for  Chief Complaint   Patient presents with   • Blood Disorder     Denies latex allergy or sensitivity.    Medication verified, no changes.  PCP and Pharmacy verified.    Social History     Tobacco Use   Smoking Status Never   Smokeless Tobacco Never     Advance Directives Filed: Yes    ECOG:   ECOG [10/04/23 1628]   ECOG Performance Status 0       Vitals:    Visit Vitals  /80 (BP Location: RUE - Right upper extremity, Patient Position: Sitting, Cuff Size: Regular)   Pulse 63   Temp 97.7 °F (36.5 °C) (Oral)   Resp 16   Wt 88.1 kg (194 lb 3.6 oz)   SpO2 99%   BMI 31.35 kg/m²       These vital signs are:  Within defined parameters (Per Reference \"Defined Limits Hospital Outpatient Department (HOD)\")    Height: No.  Ht Readings from Last 1 Encounters:   09/01/23 5' 6\" (1.676 m)     Weight:Yes, shoes on.  Wt Readings from Last 3 Encounters:   10/04/23 88.1 kg (194 lb 3.6 oz)   09/26/23 88.6 kg (195 lb 3.5 oz)   09/01/23 88.6 kg (195 lb 6.4 oz)       BMI: Body mass index is 31.35 kg/m².    REVIEW OF SYSTEMS  GENERAL:  Patient denies fevers, chills, night sweats, excessive fatigue, change in appetite, weight loss, dizziness, but complains of: headache  ALLERGIC/IMMUNOLOGIC: Verified allergies: Yes  EYES:  Patient denies significant visual difficulties, double vision, but complains of: blurred vision  ENT/MOUTH: Patient denies problems with hearing, sore throat, mouth sores, but complains of: sinus drainage  ENDOCRINE:  Patient denies diabetes, thyroid disease, hormone  replacement, hot flashes  HEMATOLOGIC/LYMPHATIC: Patient denies easy bruising, bleeding, tender lymph nodes, swollen lymph nodes  BREASTS: Patient denies abnormal masses of breast, nipple discharge, pain  RESPIRATORY:  Patient denies lung pain with breathing, cough, coughing up blood, shortness of breath  CARDIOVASCULAR:  Patient denies anginal chest pain, palpitations, shortness of breath when lying flat, but complains of: peripheral edema fingers on both hands  GASTROINTESTINAL: Patient denies abdominal pain , nausea, vomiting, diarrhea, GI bleeding, constipation, change in bowel habits, sensation of feeling full, difficulty swallowing, but complains of: heartburn  : Patient denies abnormal genital masses, blood in the urine, frequency, urgency, burning with urination, hesitancy, incontinence, vaginal bleeding, discharge  MUSCULOSKELETAL:  Patient denies bone pain, redness, decreased range of motion, but complains of: joint pain, pain ratin, location: fingers on both hands and joint swelling fingers on both hands  SKIN:  Patient denies chronic rashes, inflammation, ulcerations, skin changes, itching  NEUROLOGIC:  Patient denies loss of balance, areas of focal weakness, abnormal gait, sensory problems, numbness, tingling  PSYCHIATRIC: Patient denies insomnia, depression, anxiety    This patient reported abnormal symptoms that needed immediate verbal communication: No        A 14-point review of system was otherwise negative except for the problems described above.    Medications  Current Outpatient Medications   Medication Sig Dispense Refill   • D-MANNOSE PO      • Cranberry 1000 MG Cap      • DULoxetine (CYMBALTA) 60 MG capsule Take 1 capsule by mouth daily. 90 capsule 1   • pantoprazole (PROTONIX) 40 MG tablet Take 1 tablet by mouth in the morning and 1 tablet in the evening. 60 tablet 2   • gabapentin (NEURONTIN) 300 MG capsule Take 300 mg by mouth in the morning and 300 mg at noon and 300 mg in the  evening.     • warfarin (COUMADIN) 5 MG tablet Take 5 mg by mouth daily.     • fluticasone (FLONASE ALLERGY RELIEF) 50 MCG/ACT nasal spray Spray 2 sprays in each nostril daily. 16 g 5   • lisinopril (ZESTRIL) 2.5 MG tablet Take 2.5 mg by mouth daily.     • Metoprolol Succinate 25 MG Capsule ER 24 Hour Sprinkle Take 25 mg by mouth daily.      • calcium carbonate-cholecalciferol (CALTRATE 600+D) 600-800 MG-UNIT tablet daily     • Cetirizine HCl (ZYRTEC ALLERGY) 10 MG Cap 10 mg.       No current facility-administered medications for this visit.       Allergies  ALLERGIES:   Allergen Reactions   • Codeine SHORTNESS OF BREATH   • Prochlorperazine ANAPHYLAXIS     Compazine   • Compazine RASH   • Dust Other (See Comments)     Unknown   • Hydrocodone-Apap CONSTIPATION   • Mold   (Environmental) PRURITUS   • Oxycodone-Acetaminophen CONSTIPATION   • Seasonal PRURITUS         Objective    Vitals:    Vitals:    10/04/23 1633   BP: 126/80   BP Location: RUE - Right upper extremity   Patient Position: Sitting   Cuff Size: Regular   Pulse: 63   Resp: 16   Temp: 97.7 °F (36.5 °C)   TempSrc: Oral   SpO2: 99%   Weight: 88.1 kg (194 lb 3.6 oz)   PainSc: 7    PainLoc: Finger        General:  Appears stated age, no acute distress.  HEENT:  Pupils equally round, reactive to light with extraocular movements intact.  Anicteric sclerae, no mucositis or oral lesions.  Neck:  Supple.  No cervical or supraclavicular lymphadenopathy appreciated.    Lungs:  Normal vesicular breathing.  Clear to auscultation bilaterally.  Cardiovascular:  S1 + S2 + 0, regular rate and rhythm.  No rubs, murmurs or gallop.  Abdomen:  Soft, nontender, no hepatosplenomegaly appreciated.  Bowel sounds positive.  Extremities:  No cyanosis, clubbing or edema.   Lymphatics:  There is no cervical, supraclavicular, axillary or inguinal lymphadenopathy appreciated.   Skin:  No bruises or petechiae seen.  Psychiatric: Cooperative. Appropriate mood and affect. Normal  judgment.          Labs  Recent Labs   Lab 09/26/23  1510   WBC 7.4   RBC 3.93*   HGB 10.0*   HCT 32.7*   MCV 83.2      Absolute Neutrophils 4.2   Absolute Lymphocytes 1.8   Absolute Monocytes 0.7   Absolute Eosinophils  0.5   Absolute Basophils 0.1     Recent Labs   Lab 09/26/23  1510   Glucose 93   Sodium 138   Potassium 4.1   Chloride 103   Carbon Dioxide 26   BUN 29*   Creatinine 1.26*   Calcium 8.6   Protein, Total 6.7  7.2   Albumin 3.5*   GOT/AST 16   Alkaline Phosphatase 66   GPT 18     Recent Labs   Lab 09/26/23  1510   Anion Gap 13   Globulin 3.7   LD, Total 231   Bilirubin, Total 0.3            Diagnostic studies  None pertinent.        Assessment and plan  ASSESSMENT: Anemia, unspecified type  (primary encounter diagnosis)    09/01/2023, adequate vitamin B12, folate.  09/26/2023, lab show hemolysis panel negative, plasma cell profile reported as 0.1 grams/deciliter of M protein.  IgG kappa monoclonal protein identified, elevated free kappa light chains, normal free lambda light chains with preserved kappa to lambda light chain ratio, normal quantitative immunoglobulins.  Stool occult blood positive.      .    Monoclonal gammopathy  Continue to monitor plasma cell profile.      Iron deficiency anemia, unspecified iron deficiency anemia type  Initiate patient on IV iron supplementation.    Follow-up in about 3 months with interval follow-up CBC diff, CMP, iron studies.    Stool occult blood positive   GI evaluation      Recommend continued follow-up with primary care physician for chronic medical conditions and healthcare maintenance. Age-appropriate screening as per the primary care physician.    Patient indicates/verbalized understanding of the diagnosis and agrees with the above plan of care.  Patient was encouraged to contact me or the clinical nurse/ nurse navigator prior to the next appointment should they have any questions and or concerns.    Thank you Hay Milner MD for letting me  participate in the patient's care.  Please feel free to contact me with any questions and or concerns.             Followed protocol

## 2023-11-27 ENCOUNTER — OFFICE (OUTPATIENT)
Dept: URBAN - METROPOLITAN AREA CLINIC 90 | Facility: CLINIC | Age: 68
Setting detail: OPHTHALMOLOGY
End: 2023-11-27
Payer: MEDICARE

## 2023-11-27 DIAGNOSIS — H25.13: ICD-10-CM

## 2023-11-27 DIAGNOSIS — H40.1111: ICD-10-CM

## 2023-11-27 DIAGNOSIS — H40.1122: ICD-10-CM

## 2023-11-27 DIAGNOSIS — H35.033: ICD-10-CM

## 2023-11-27 PROCEDURE — 92014 COMPRE OPH EXAM EST PT 1/>: CPT | Performed by: OPHTHALMOLOGY

## 2023-11-27 PROCEDURE — 92133 CPTRZD OPH DX IMG PST SGM ON: CPT | Performed by: OPHTHALMOLOGY

## 2023-11-27 ASSESSMENT — REFRACTION_CURRENTRX
OD_AXIS: 067
OD_SPHERE: +2.00
OS_CYLINDER: -0.25
OS_AXIS: 180
OD_OVR_VA: 20/
OS_VPRISM_DIRECTION: PROGS
OD_AXIS: 064
OS_VPRISM_DIRECTION: PROGS
OS_CYLINDER: SPH
OD_VPRISM_DIRECTION: PROGS
OS_AXIS: 180
OS_ADD: +2.50
OD_SPHERE: +2.00
OD_ADD: +2.50
OD_ADD: +3.00
OS_OVR_VA: 20/
OD_CYLINDER: -0.75
OS_CYLINDER: SPHERE
OD_CYLINDER: -0.75
OD_SPHERE: +2.00
OD_OVR_VA: 20/
OS_ADD: +2.50
OD_VPRISM_DIRECTION: PROGS
OD_ADD: +2.50
OD_CYLINDER: -0.50
OD_AXIS: 069
OD_VPRISM_DIRECTION: PROGS
OS_OVR_VA: 20/
OS_ADD: +3.00
OS_SPHERE: -0.75
OS_SPHERE: -0.75
OD_OVR_VA: 20/
OS_AXIS: 090
OS_VPRISM_DIRECTION: PROGS
OS_SPHERE: -0.50
OS_OVR_VA: 20/

## 2023-11-27 ASSESSMENT — REFRACTION_MANIFEST
OD_AXIS: 070
OD_VA1: 20/20
OS_ADD: +2.50
OS_CYLINDER: -0.25
OS_ADD: +3.00
OS_VA2: 20/20
OD_ADD: +2.75
OS_AXIS: 170
OS_ADD: +2.50
OS_SPHERE: -1.25
OD_VA1: 20/20
OD_ADD: +2.50
OD_SPHERE: +1.75
OD_VA2: 20/20
OS_AXIS: 065
OD_VA2: 20/25(J1)
OS_CYLINDER: -0.50
OS_SPHERE: -1.25
OD_VA2: 20/25(J1)
OD_CYLINDER: -0.75
OS_VA1: 20/30+-
OD_AXIS: 065
OS_VA2: 20/25(J1)
OS_VA2: 20/25(J1)
OD_VA1: 20/20
OD_AXIS: 070
OD_CYLINDER: -0.50
OD_SPHERE: +1.75
OS_VA1: 20/30+-
OS_VA1: 20/20
OS_CYLINDER: -0.25
OD_CYLINDER: -0.75
OS_SPHERE: -1.50
OD_ADD: +2.50
OD_SPHERE: +1.75
OS_AXIS: 065

## 2023-11-27 ASSESSMENT — SPHEQUIV_DERIVED
OS_SPHEQUIV: -1.375
OS_SPHEQUIV: -1.75
OD_SPHEQUIV: 1.75
OD_SPHEQUIV: 1.5
OD_SPHEQUIV: 1.375
OS_SPHEQUIV: -1.375
OD_SPHEQUIV: 1.375

## 2023-11-27 ASSESSMENT — CONFRONTATIONAL VISUAL FIELD TEST (CVF)
OS_FINDINGS: FULL
OD_FINDINGS: FULL

## 2023-11-27 ASSESSMENT — REFRACTION_AUTOREFRACTION
OD_CYLINDER: -0.50
OS_SPHERE: -1.75
OD_AXIS: 061
OS_CYLINDER: SPH
OD_SPHERE: +2.00

## 2023-11-27 ASSESSMENT — LID POSITION - DERMATOCHALASIS: OD_DERMATOCHALASIS: RUL 1+

## 2024-05-30 ENCOUNTER — OFFICE (OUTPATIENT)
Dept: URBAN - METROPOLITAN AREA CLINIC 90 | Facility: CLINIC | Age: 69
Setting detail: OPHTHALMOLOGY
End: 2024-05-30
Payer: MEDICARE

## 2024-05-30 DIAGNOSIS — H40.1111: ICD-10-CM

## 2024-05-30 DIAGNOSIS — H40.1122: ICD-10-CM

## 2024-05-30 PROBLEM — H02.831 DERMATOCHALASIS; RIGHT UPPER LID, , LEFT UPPER LID: Status: ACTIVE | Noted: 2024-05-30

## 2024-05-30 PROBLEM — H02.834 DERMATOCHALASIS; RIGHT UPPER LID, , LEFT UPPER LID: Status: ACTIVE | Noted: 2024-05-30

## 2024-05-30 PROCEDURE — 92014 COMPRE OPH EXAM EST PT 1/>: CPT | Performed by: OPHTHALMOLOGY

## 2024-05-30 PROCEDURE — 92083 EXTENDED VISUAL FIELD XM: CPT | Performed by: OPHTHALMOLOGY

## 2024-05-30 PROCEDURE — 92250 FUNDUS PHOTOGRAPHY W/I&R: CPT | Performed by: OPHTHALMOLOGY

## 2024-05-30 ASSESSMENT — LID POSITION - DERMATOCHALASIS
OS_DERMATOCHALASIS: LUL
OD_DERMATOCHALASIS: RUL 1+

## 2024-05-30 ASSESSMENT — CONFRONTATIONAL VISUAL FIELD TEST (CVF)
OD_FINDINGS: FULL
OS_FINDINGS: FULL

## 2024-06-06 NOTE — H&P PST ADULT - SKIN/BREAST
ensure successful automatic transmissions in the future.     Please be aware that the remote device transmission sites are periodically monitored only during regular business hours during which simultaneous in-office device clinics are being conducted. If your transmission requires attention, we will contact you as soon as possible.    Your in-home monitor will do a full report on your device every 3 months (recurring appointments that run parallel to in office checks). You do not need to initiate a transmission or be awake at the appointment time listed - this is solely for office purposes.     Our office utilizes the \"no news is good news\" narrative regarding remote monitoring. A Device Specialist or RN will contact you with any critical findings from your remote monitoring.    Going forward, if you experience issues with your in-home monitor, please verify that it is plugged in to the wall. If issues persist, please contact the Customer Service numbers provided below, as they can troubleshoot issues that may be happening with the monitor itself. The [facility] works closely with the remote monitoring websites - if we notice there is a disconnection we will contact you to inform you and ask you to contact the  of your device.    [facility] [facility] ([place]) [phone]     negative

## 2024-06-21 NOTE — BRIEF OPERATIVE NOTE - COMMENTS
[FreeTextEntry1] : I have independently reviewed the following: CT chest w/o contrast on 6/13/24 at [facility]
Pt on therapeutic anticoagulation

## 2024-09-05 NOTE — ED ADULT NURSE NOTE - CAS EDN DISCHARGE ASSESSMENT
[FreeTextEntry1] : Follow-up in 3-months
Patient baseline mental status/Alert and oriented to person, place and time

## 2024-09-26 NOTE — ED ADULT TRIAGE NOTE - WEIGHT IN LBS
-- DO NOT REPLY / DO NOT REPLY ALL --  -- This inbox is not monitored. If this was sent to the wrong provider or department, reroute message to  [employer]. --  -- Message is from [facility] ([facility]) --    Message: patient would like the medicine to be sent to the doctors office where she will get the injection there on her appointment 10/21/24 [facility] number [phone].  It must be ship to [address] [place] [address] because that is the location she sees Dr. Vandana Colbert.  Patient will like a call back at [phone].      Pending Next Scheduled Appointment: 10/21/2024 at 11:00 AM with provider Vandana Colbert DO     No protocol for requested medication.  Medication: DENOSumab (Prolia) 60 MG/ML Solution Prefilled Syringe   Last office visit date: 07/03/2024 with provider Vandana Colbert DO  Pharmacy: [facility] - [place], [place] - [address]  Order pended, routed to clinician for review.               
-- DO NOT REPLY / DO NOT REPLY ALL --  -- This inbox is not monitored. If this was sent to the wrong provider or department, reroute message to [employer]. --  -- Message is from [facility] ([facility]) --      Message Type:  Refill Medication   Refill request for Pended medication named: DENOSumab (Prolia) 60 MG/ML Solution Prefilled Syringe   Preferred pharmacy verified, and selected.        Is the patient OUT of Medication?  Yes and Medication Refills handled by [facility] Clinical        Message: patient would like the medicine to be sent to the doctors office where she will get the injection there on her appointment 10/21/24 [facility] number [phone]                   
No protocol for requested medication.    Medication: DENOSumab (Prolia) 60 MG/ML Solution Prefilled Syringe   Last office visit date: 7/3/24  [place]: 10/21/24  Pharmacy: [facility] - [place], [place] - [facility] [address]    Order pended, routed to clinician for review.     
160

## 2024-11-12 NOTE — PATIENT PROFILE ADULT - BRADEN FRICTION AND SHEAR
Quality 226: Preventive Care And Screening: Tobacco Use: Screening And Cessation Intervention: Patient screened for tobacco use and is an ex/non-smoker Detail Level: Generalized Quality 431: Preventive Care And Screening: Unhealthy Alcohol Use - Screening: Patient not identified as an unhealthy alcohol user when screened for unhealthy alcohol use using a systematic screening method Quality 130: Documentation Of Current Medications In The Medical Record: Current Medications Documented Quality 410: Psoriasis Clinical Response To Oral Systemic Or Biologic Medications: Patient has been on biologic or system therapy for less than 6 months Quality 485: Psoriasis - Improvement In Patient-Reported Itch Severity: Itch severity assessment score is reduced by 3 or more points from the initial (index) assessment score to the follow-up visit score (3) no apparent problem

## 2024-12-12 ENCOUNTER — OFFICE (OUTPATIENT)
Facility: LOCATION | Age: 69
Setting detail: OPHTHALMOLOGY
End: 2024-12-12
Payer: MEDICARE

## 2024-12-12 DIAGNOSIS — H40.1111: ICD-10-CM

## 2024-12-12 DIAGNOSIS — H16.221: ICD-10-CM

## 2024-12-12 DIAGNOSIS — H02.834: ICD-10-CM

## 2024-12-12 DIAGNOSIS — H25.13: ICD-10-CM

## 2024-12-12 DIAGNOSIS — H35.033: ICD-10-CM

## 2024-12-12 DIAGNOSIS — H40.1122: ICD-10-CM

## 2024-12-12 DIAGNOSIS — H57.02: ICD-10-CM

## 2024-12-12 DIAGNOSIS — H02.831: ICD-10-CM

## 2024-12-12 PROCEDURE — 92133 CPTRZD OPH DX IMG PST SGM ON: CPT | Performed by: OPHTHALMOLOGY

## 2024-12-12 PROCEDURE — 92014 COMPRE OPH EXAM EST PT 1/>: CPT | Performed by: OPHTHALMOLOGY

## 2024-12-12 PROCEDURE — 92083 EXTENDED VISUAL FIELD XM: CPT | Performed by: OPHTHALMOLOGY

## 2024-12-12 ASSESSMENT — KERATOMETRY
OD_K2POWER_DIOPTERS: 44.75
OS_K1POWER_DIOPTERS: 44.25
OS_AXISANGLE_DEGREES: 082
OS_K2POWER_DIOPTERS: 44.75
OD_AXISANGLE_DEGREES: 086
OD_K1POWER_DIOPTERS: 44.25
METHOD_AUTO_MANUAL: AUTO

## 2024-12-12 ASSESSMENT — PACHYMETRY
OD_CT_UM: 562
OS_CT_UM: 567
OS_CT_CORRECTION: -1
OD_CT_CORRECTION: -1

## 2024-12-12 ASSESSMENT — REFRACTION_MANIFEST
OD_AXIS: 070
OS_VA2: 20/25(J1)
OD_AXIS: 070
OS_AXIS: 065
OS_CYLINDER: -0.25
OS_VA2: 20/25(J1)
OS_VA1: 20/30+-
OS_AXIS: 170
OS_ADD: +2.50
OS_AXIS: 065
OD_SPHERE: +1.75
OD_VA2: 20/20
OD_VA1: 20/20
OD_VA2: 20/25(J1)
OD_AXIS: 065
OD_SPHERE: +1.75
OD_CYLINDER: -0.75
OD_ADD: +2.50
OD_ADD: +2.75
OS_VA1: 20/20
OD_CYLINDER: -0.50
OD_VA1: 20/20
OS_ADD: +2.50
OS_SPHERE: -1.50
OD_CYLINDER: -0.75
OS_CYLINDER: -0.50
OD_ADD: +2.50
OD_SPHERE: +1.75
OS_ADD: +3.00
OD_VA1: 20/20
OS_SPHERE: -1.25
OD_VA2: 20/25(J1)
OS_CYLINDER: -0.25
OS_SPHERE: -1.25
OS_VA1: 20/30+-
OS_VA2: 20/20

## 2024-12-12 ASSESSMENT — REFRACTION_CURRENTRX
OS_SPHERE: -0.50
OD_ADD: +3.00
OD_ADD: +2.50
OS_SPHERE: -0.75
OD_VPRISM_DIRECTION: PROGS
OS_AXIS: 180
OS_OVR_VA: 20/
OS_ADD: +2.50
OS_OVR_VA: 20/
OD_OVR_VA: 20/
OS_VPRISM_DIRECTION: PROGS
OD_SPHERE: +2.00
OD_VPRISM_DIRECTION: PROGS
OD_AXIS: 067
OS_VPRISM_DIRECTION: PROGS
OS_SPHERE: +0.75
OD_CYLINDER: -0.75
OD_CYLINDER: -0.75
OS_SPHERE: -0.75
OD_SPHERE: +2.00
OD_AXIS: 064
OD_SPHERE: +2.00
OD_VPRISM_DIRECTION: PROGS
OS_OVR_VA: 20/
OD_OVR_VA: 20/
OS_VPRISM_DIRECTION: PROGS
OD_VPRISM_DIRECTION: PROGS
OS_CYLINDER: -1.75
OS_ADD: +3.00
OD_ADD: +2.50
OS_AXIS: 090
OS_CYLINDER: SPH
OD_ADD: +2.50
OD_AXIS: 057
OS_CYLINDER: -0.25
OD_CYLINDER: -0.50
OD_OVR_VA: 20/
OS_ADD: +2.50
OD_SPHERE: +2.00
OS_VPRISM_DIRECTION: PROGS
OS_CYLINDER: SPHERE
OD_CYLINDER: -0.75
OS_AXIS: 180
OS_AXIS: 051
OS_ADD: +2.50
OD_AXIS: 069

## 2024-12-12 ASSESSMENT — SUPERFICIAL PUNCTATE KERATITIS (SPK)
OD_SPK: T
OS_SPK: ABSENT

## 2024-12-12 ASSESSMENT — REFRACTION_AUTOREFRACTION
OD_CYLINDER: -0.25
OS_SPHERE: -1.25
OD_AXIS: 033
OD_SPHERE: +1.75
OS_AXIS: 006
OS_CYLINDER: -0.50

## 2024-12-12 ASSESSMENT — TONOMETRY
OS_IOP_MMHG: 11
OD_IOP_MMHG: 11
OS_IOP_MMHG: 12
OD_IOP_MMHG: 11

## 2024-12-12 ASSESSMENT — CONFRONTATIONAL VISUAL FIELD TEST (CVF)
OD_FINDINGS: FULL
OS_FINDINGS: FULL

## 2024-12-12 ASSESSMENT — VISUAL ACUITY
OS_BCVA: 20/25+2
OD_BCVA: 20/40

## 2024-12-12 ASSESSMENT — LID POSITION - DERMATOCHALASIS
OS_DERMATOCHALASIS: LUL
OD_DERMATOCHALASIS: RUL 1+

## 2024-12-12 ASSESSMENT — LID POSITION - PTOSIS: OD_PTOSIS: RUL 1+

## 2025-01-29 NOTE — ED ADULT NURSE NOTE - OBJECTIVE STATEMENT
67 yr old male arrived to the ED from home s/p 1 day hernia repair yesterday c/o pain and hematoma by surgical site. pt states when he was here they were aware of the hematoma, ran tests but told him nothing and discharged him. pt states he was had difficulty walking and caring for himself, today while putting his shoes on he noted his scrotum was more swollen. pt called surgeon who told him to come to ED. upon assessment pt is A&ox4, ambulates with assistance. lungs clear courtney. surgical incision noted in RLQ. large area of ecchymosis and hematoma noted over RLQ and spreading to umbilicus. pts scrotum has noted edema and is firm to palpation. pt states he gets pain relief form the oxycodone but the pain comes back fairly quick. pt denies fever, chills, nausea, vomiting, chest pain and sob. 67 yr old male arrived to the ED from home s/p  1 day out from  hernia repair yesterday c/o pain and hematoma by surgical site. pt states when he was here they were aware of the hematoma, ran tests but told him nothing and discharged him. pt states he has had difficulty walking and caring for himself, today while putting his shoes on he noted his scrotum was more swollen. pt called surgeon who told him to come to ED. upon assessment pt is A&ox4, ambulates with assistance. lungs clear courtney. surgical incision noted in RLQ. large area of ecchymosis and hematoma noted over RLQ and spreading to umbilicus. pts scrotum has noted edema and is firm to palpation. pt states he gets pain relief form the oxycodone but the pain comes back fairly quick. pt denies fever, chills, nausea, vomiting, chest pain and sob. 67 yr old male arrived to the ED from home s/p  1 day out from  hernia repair yesterday c/o pain and hematoma by surgical site. pt states when he was here they were aware of the hematoma, ran tests but told him nothing and discharged him. pt states he has had difficulty walking and caring for himself, today while putting his shoes on he noted his scrotum was more swollen. pt dose endorses standing and walking more while at home pt called surgeon who told him to come to ED. upon assessment pt is A&ox4, ambulates with assistance. lungs clear courtney. surgical incision noted in RLQ. large area of ecchymosis and hematoma noted over RLQ and spreading to umbilicus. pts scrotum has noted edema and is firm to palpation. pt states he gets pain relief form the oxycodone but the pain comes back fairly quick. pt denies fever, chills, nausea, vomiting, chest pain and sob. no

## 2025-06-17 ENCOUNTER — OFFICE (OUTPATIENT)
Facility: LOCATION | Age: 70
Setting detail: OPHTHALMOLOGY
End: 2025-06-17
Payer: MEDICARE

## 2025-06-17 ENCOUNTER — RX ONLY (RX ONLY)
Age: 70
End: 2025-06-17

## 2025-06-17 DIAGNOSIS — H52.7: ICD-10-CM

## 2025-06-17 DIAGNOSIS — H25.13: ICD-10-CM

## 2025-06-17 DIAGNOSIS — H35.033: ICD-10-CM

## 2025-06-17 DIAGNOSIS — H40.1122: ICD-10-CM

## 2025-06-17 DIAGNOSIS — H40.1111: ICD-10-CM

## 2025-06-17 PROBLEM — H01.005 BLEPHARITIS; RIGHT UPPER LID, RIGHT LOWER LID, LEFT UPPER LID, LEFT LOWER LID: Status: ACTIVE | Noted: 2025-06-17

## 2025-06-17 PROBLEM — H01.004 BLEPHARITIS; RIGHT UPPER LID, RIGHT LOWER LID, LEFT UPPER LID, LEFT LOWER LID: Status: ACTIVE | Noted: 2025-06-17

## 2025-06-17 PROBLEM — H01.002 BLEPHARITIS; RIGHT UPPER LID, RIGHT LOWER LID, LEFT UPPER LID, LEFT LOWER LID: Status: ACTIVE | Noted: 2025-06-17

## 2025-06-17 PROBLEM — H01.001 BLEPHARITIS; RIGHT UPPER LID, RIGHT LOWER LID, LEFT UPPER LID, LEFT LOWER LID: Status: ACTIVE | Noted: 2025-06-17

## 2025-06-17 PROCEDURE — 92014 COMPRE OPH EXAM EST PT 1/>: CPT | Performed by: OPHTHALMOLOGY

## 2025-06-17 PROCEDURE — 92015 DETERMINE REFRACTIVE STATE: CPT | Performed by: OPHTHALMOLOGY

## 2025-06-17 ASSESSMENT — PACHYMETRY
OS_CT_UM: 567
OS_CT_CORRECTION: -1
OD_CT_UM: 562
OD_CT_CORRECTION: -1

## 2025-06-17 ASSESSMENT — REFRACTION_MANIFEST
OS_VA2: 20/25(J1)
OS_SPHERE: -1.25
OS_VA2: 20/25(J1)
OD_SPHERE: +1.75
OS_SPHERE: -1.25
OD_VA2: 20/25(J1)
OD_ADD: +2.50
OS_ADD: +3.00
OD_ADD: +2.50
OS_AXIS: 180
OS_AXIS: 065
OD_CYLINDER: -0.50
OD_AXIS: 065
OS_VA1: 20/20
OS_ADD: +2.75
OS_VA2: 20/20
OD_ADD: +2.75
OD_AXIS: 035
OD_VA2: 20/25(J1)
OS_VA1: 20/30+-
OD_CYLINDER: -0.75
OS_ADD: +2.50
OS_VA1: 20/25
OS_CYLINDER: -0.25
OD_SPHERE: +1.75
OS_AXIS: 170
OD_SPHERE: +2.00
OS_SPHERE: -1.50
OD_VA2: 20/20
OS_AXIS: 065
OD_CYLINDER: -0.75
OD_AXIS: 070
OD_SPHERE: +1.75
OD_VA1: 20/20
OS_CYLINDER: -0.50
OD_AXIS: 070
OS_VA1: 20/30+-
OS_CYLINDER: -0.25
OD_VA1: 20/20
OS_SPHERE: -1.25
OS_CYLINDER: -0.25
OD_VA1: 20/20
OD_CYLINDER: -0.75
OD_VA1: 20/25+
OS_ADD: +2.50
OD_ADD: +2.75

## 2025-06-17 ASSESSMENT — REFRACTION_CURRENTRX
OD_VPRISM_DIRECTION: PROGS
OS_AXIS: 164
OD_AXIS: 069
OD_AXIS: 067
OS_VPRISM_DIRECTION: PROGS
OD_SPHERE: +2.00
OS_AXIS: 051
OD_CYLINDER: -0.75
OS_OVR_VA: 20/
OS_VPRISM_DIRECTION: PROGS
OD_CYLINDER: -0.50
OD_ADD: +2.50
OD_CYLINDER: -0.75
OD_ADD: +2.50
OS_ADD: +2.50
OD_OVR_VA: 20/
OD_VPRISM_DIRECTION: PROGS
OS_CYLINDER: SPH
OS_SPHERE: +0.75
OD_ADD: +2.00
OD_OVR_VA: 20/
OD_ADD: +2.50
OS_AXIS: 180
OS_ADD: +3.00
OS_VPRISM_DIRECTION: PROGS
OS_CYLINDER: -1.75
OS_VPRISM_DIRECTION: PROGS
OS_CYLINDER: -0.25
OS_ADD: +2.50
OD_SPHERE: +2.00
OS_CYLINDER: -0.25
OD_SPHERE: +2.00
OS_ADD: +2.00
OD_AXIS: 057
OS_OVR_VA: 20/
OS_AXIS: 180
OS_AXIS: 090
OD_CYLINDER: -0.75
OD_VPRISM_DIRECTION: PROGS
OS_VPRISM_DIRECTION: PROGS
OD_OVR_VA: 20/
OS_ADD: +2.50
OS_SPHERE: -0.75
OD_AXIS: 064
OD_ADD: +3.00
OS_SPHERE: -0.75
OD_VPRISM_DIRECTION: PROGS
OD_SPHERE: +2.00
OS_SPHERE: -0.50
OD_VPRISM_DIRECTION: PROGS
OS_OVR_VA: 20/
OD_CYLINDER: -0.75
OD_SPHERE: +2.00
OS_CYLINDER: SPHERE
OS_SPHERE: -0.75
OD_AXIS: 071

## 2025-06-17 ASSESSMENT — TONOMETRY
OS_IOP_MMHG: 14
OD_IOP_MMHG: 12
OD_IOP_MMHG: 15
OS_IOP_MMHG: 16

## 2025-06-17 ASSESSMENT — KERATOMETRY
OS_AXISANGLE_DEGREES: 081
OD_K2POWER_DIOPTERS: 45.00
OD_K1POWER_DIOPTERS: 44.25
OD_AXISANGLE_DEGREES: 090
METHOD_AUTO_MANUAL: AUTO
OS_K1POWER_DIOPTERS: 44.50
OS_K2POWER_DIOPTERS: 45.00

## 2025-06-17 ASSESSMENT — REFRACTION_AUTOREFRACTION
OD_CYLINDER: -0.50
OD_AXIS: 036
OS_CYLINDER: -0.50
OS_AXIS: 177
OD_SPHERE: +2.00
OS_SPHERE: -1.25

## 2025-06-17 ASSESSMENT — CONFRONTATIONAL VISUAL FIELD TEST (CVF)
OD_FINDINGS: FULL
OS_FINDINGS: FULL

## 2025-06-17 ASSESSMENT — LID POSITION - DERMATOCHALASIS
OD_DERMATOCHALASIS: RUL 1+
OS_DERMATOCHALASIS: LUL

## 2025-06-17 ASSESSMENT — SUPERFICIAL PUNCTATE KERATITIS (SPK)
OD_SPK: T
OS_SPK: ABSENT

## 2025-06-17 ASSESSMENT — LID EXAM ASSESSMENTS
OD_BLEPHARITIS: RLL RUL 2+
OS_BLEPHARITIS: LLL LUL 2+

## 2025-06-17 ASSESSMENT — VISUAL ACUITY
OD_BCVA: 20/60-1
OS_BCVA: 20/30+1

## 2025-06-17 ASSESSMENT — LID POSITION - PTOSIS: OD_PTOSIS: RUL 1+
